# Patient Record
Sex: FEMALE | Race: WHITE | Employment: OTHER | URBAN - METROPOLITAN AREA
[De-identification: names, ages, dates, MRNs, and addresses within clinical notes are randomized per-mention and may not be internally consistent; named-entity substitution may affect disease eponyms.]

---

## 2017-05-11 ENCOUNTER — HOSPITAL ENCOUNTER (OUTPATIENT)
Dept: WOMENS IMAGING | Age: 67
Discharge: HOME | End: 2017-05-11
Attending: FAMILY MEDICINE | Admitting: FAMILY MEDICINE

## 2017-05-11 DIAGNOSIS — Z12.31 VISIT FOR SCREENING MAMMOGRAM: ICD-10-CM

## 2018-03-29 PROBLEM — R55 SYNCOPE AND COLLAPSE: Status: ACTIVE | Noted: 2018-03-29

## 2018-03-29 PROBLEM — E03.9 HYPOTHYROIDISM: Status: ACTIVE | Noted: 2018-03-29

## 2018-03-29 PROBLEM — H40.9 GLAUCOMA: Status: ACTIVE | Noted: 2018-03-29

## 2018-03-29 PROBLEM — E87.1 HYPONATREMIA: Status: ACTIVE | Noted: 2018-03-29

## 2018-03-29 PROBLEM — E86.0 DEHYDRATION: Status: ACTIVE | Noted: 2018-03-29

## 2018-04-28 PROBLEM — E86.0 DEHYDRATION: Status: RESOLVED | Noted: 2018-03-29 | Resolved: 2018-04-28

## 2018-05-24 ENCOUNTER — HOSPITAL ENCOUNTER (OUTPATIENT)
Dept: MAMMOGRAPHY | Age: 68
Discharge: HOME | End: 2018-05-24
Attending: FAMILY MEDICINE | Admitting: FAMILY MEDICINE

## 2018-05-24 DIAGNOSIS — Z12.31 VISIT FOR SCREENING MAMMOGRAM: ICD-10-CM

## 2019-05-28 ENCOUNTER — HOSPITAL ENCOUNTER (OUTPATIENT)
Dept: WOMENS IMAGING | Age: 69
Discharge: HOME | End: 2019-05-28
Payer: MEDICARE

## 2019-05-28 DIAGNOSIS — Z80.3 FAMILY HISTORY OF BREAST CANCER IN MOTHER: ICD-10-CM

## 2019-05-28 DIAGNOSIS — Z12.39 BREAST CANCER SCREENING: ICD-10-CM

## 2019-05-28 PROCEDURE — 77063 BREAST TOMOSYNTHESIS BI: CPT

## 2019-06-17 ENCOUNTER — HOSPITAL ENCOUNTER (OUTPATIENT)
Dept: GENERAL RADIOLOGY | Age: 69
Discharge: HOME | End: 2019-06-17
Payer: MEDICARE

## 2019-06-17 DIAGNOSIS — M81.0 OSTEOPOROSIS, UNSPECIFIED OSTEOPOROSIS TYPE, UNSPECIFIED PATHOLOGICAL FRACTURE PRESENCE: ICD-10-CM

## 2019-06-17 DIAGNOSIS — Z00.00 WELL WOMAN EXAM (NO GYNECOLOGICAL EXAM): ICD-10-CM

## 2019-06-17 PROCEDURE — 77080 DXA BONE DENSITY AXIAL: CPT

## 2020-07-13 ENCOUNTER — HOSPITAL ENCOUNTER (OUTPATIENT)
Dept: WOMENS IMAGING | Age: 70
Discharge: HOME | End: 2020-07-13
Payer: MEDICARE

## 2020-07-13 PROCEDURE — 77063 BREAST TOMOSYNTHESIS BI: CPT

## 2020-08-02 ENCOUNTER — APPOINTMENT (OUTPATIENT)
Dept: GENERAL RADIOLOGY | Age: 70
End: 2020-08-02
Payer: MEDICARE

## 2020-08-02 ENCOUNTER — HOSPITAL ENCOUNTER (EMERGENCY)
Age: 70
Discharge: HOME | End: 2020-08-02
Attending: EMERGENCY MEDICINE
Payer: MEDICARE

## 2020-08-02 ENCOUNTER — APPOINTMENT (OUTPATIENT)
Dept: CT IMAGING | Age: 70
End: 2020-08-02
Payer: MEDICARE

## 2020-08-02 VITALS
DIASTOLIC BLOOD PRESSURE: 87 MMHG | OXYGEN SATURATION: 99 % | TEMPERATURE: 98.8 F | RESPIRATION RATE: 18 BRPM | HEART RATE: 84 BPM | SYSTOLIC BLOOD PRESSURE: 112 MMHG

## 2020-08-02 PROCEDURE — 6360000002 HC RX W HCPCS: Performed by: NURSE PRACTITIONER

## 2020-08-02 PROCEDURE — 6370000000 HC RX 637 (ALT 250 FOR IP): Performed by: NURSE PRACTITIONER

## 2020-08-02 PROCEDURE — 99284 EMERGENCY DEPT VISIT MOD MDM: CPT

## 2020-08-02 PROCEDURE — 96372 THER/PROPH/DIAG INJ SC/IM: CPT

## 2020-08-02 PROCEDURE — 73502 X-RAY EXAM HIP UNI 2-3 VIEWS: CPT

## 2020-08-02 PROCEDURE — 72131 CT LUMBAR SPINE W/O DYE: CPT

## 2020-08-02 RX ORDER — ORPHENADRINE CITRATE 30 MG/ML
60 INJECTION INTRAMUSCULAR; INTRAVENOUS ONCE
Status: COMPLETED | OUTPATIENT
Start: 2020-08-02 | End: 2020-08-02

## 2020-08-02 RX ORDER — KETOROLAC TROMETHAMINE 10 MG/1
10 TABLET, FILM COATED ORAL EVERY 8 HOURS PRN
Qty: 20 TABLET | Refills: 0 | Status: SHIPPED | OUTPATIENT
Start: 2020-08-02 | End: 2020-10-05 | Stop reason: ALTCHOICE

## 2020-08-02 RX ORDER — KETOROLAC TROMETHAMINE 30 MG/ML
30 INJECTION, SOLUTION INTRAMUSCULAR; INTRAVENOUS ONCE
Status: COMPLETED | OUTPATIENT
Start: 2020-08-02 | End: 2020-08-02

## 2020-08-02 RX ORDER — METHOCARBAMOL 750 MG/1
750-1500 TABLET, FILM COATED ORAL EVERY 8 HOURS PRN
Qty: 40 TABLET | Refills: 0 | Status: SHIPPED | OUTPATIENT
Start: 2020-08-02 | End: 2020-08-12

## 2020-08-02 RX ORDER — PREDNISONE 20 MG/1
60 TABLET ORAL ONCE
Status: COMPLETED | OUTPATIENT
Start: 2020-08-02 | End: 2020-08-02

## 2020-08-02 RX ORDER — HYDROCODONE BITARTRATE AND ACETAMINOPHEN 5; 325 MG/1; MG/1
1 TABLET ORAL EVERY 6 HOURS PRN
Qty: 12 TABLET | Refills: 0 | Status: SHIPPED | OUTPATIENT
Start: 2020-08-02 | End: 2020-08-05

## 2020-08-02 RX ORDER — HYDROCODONE BITARTRATE AND ACETAMINOPHEN 5; 325 MG/1; MG/1
1 TABLET ORAL ONCE
Status: COMPLETED | OUTPATIENT
Start: 2020-08-02 | End: 2020-08-02

## 2020-08-02 RX ADMIN — ORPHENADRINE CITRATE 60 MG: 30 INJECTION INTRAMUSCULAR; INTRAVENOUS at 08:42

## 2020-08-02 RX ADMIN — KETOROLAC TROMETHAMINE 30 MG: 30 INJECTION, SOLUTION INTRAMUSCULAR at 08:41

## 2020-08-02 RX ADMIN — HYDROCODONE BITARTRATE AND ACETAMINOPHEN 1 TABLET: 5; 325 TABLET ORAL at 08:41

## 2020-08-02 RX ADMIN — PREDNISONE 60 MG: 20 TABLET ORAL at 08:41

## 2020-08-02 ASSESSMENT — PAIN SCALES - GENERAL
PAINLEVEL_OUTOF10: 10
PAINLEVEL_OUTOF10: 10

## 2020-08-02 ASSESSMENT — ENCOUNTER SYMPTOMS
DIARRHEA: 0
CHEST TIGHTNESS: 0
BACK PAIN: 1
ABDOMINAL PAIN: 0
SHORTNESS OF BREATH: 0
VOMITING: 0
NAUSEA: 0

## 2020-08-02 NOTE — ED PROVIDER NOTES
I independently performed a history and physical on [facility]. All diagnostic, treatment, and disposition decisions were made by myself in conjunction with the advanced practice provider. Briefly, this is a 68 y.o. female here for right hip pain and right low back pain. The patient's pain is been present for 1 to 2 weeks. The pain is severe. She had an x-ray performed at her PCPs, but it was unremarkable. Patient occasionally has tingling down the right leg. She denies saddle anesthesia, fecal incontinence, urinary retention, focal leg weakness, and other symptoms. .    On exam, the patient appears well-hydrated, well-nourished, and in no acute distress. Mucous membranes are moist. Speech is clear. Breathing is unlabored. Skin is dry. Mental status is normal. The patient moves all extremities. Face is symmetric without droop. Lumbar spine is nontender to palpation. Straight leg raise is negative bilaterally. Patient has normal patellar reflexes bilaterally. She has normal sensation throughout the bilateral lower extremities, including the bilateral medial and lateral thighs, medial and lateral lower legs, and feet. She has normal plantar and dorsiflexion of the feet bilateral.    Patient Referrals:  [facility] Emergency Department  [address]  [phone]    As needed, If symptoms worsen or new symptoms develop    [address] [place], [place]  [address] [facility]  [phone]    In 3 days  for re-evaluation      Discharge Medications:  New Prescriptions    HYDROCODONE-ACETAMINOPHEN (NORCO) 5-325 MG PER TABLET    Take 1 tablet by mouth every 6 hours as needed for Pain for up to 3 days. Do NOT take if you are driving. This medication may make you groggy/sleepy.     KETOROLAC (TORADOL) 10 MG TABLET    Take 1 tablet by mouth every 8 hours as needed for Pain    METHOCARBAMOL (ROBAXIN-750) 750 MG TABLET    Take 1-2 tablets by mouth every 8 hours as needed (back cramps or pain) Do NOT take if you are driving. This medication may make you groggy/sleepy. FINAL IMPRESSION  1. Acute right-sided low back pain without sciatica    2. Acute right hip pain        Blood pressure (!) 155/78, pulse 70, temperature 98.8 °F (37.1 °C), temperature source Oral, resp. rate 18, SpO2 100 %. For further details of [facility] emergency department encounter, please see documentation by advanced practice provider, Jason Johnston CNP.        Barbara Cummins MD  08/02/20 7226

## 2020-08-02 NOTE — ED NOTES
Bed: 20  Expected date:   Expected time:   Means of arrival: [facility]  Comments:   [facility], [facility]  08/02/20 4319

## 2020-08-02 NOTE — ED PROVIDER NOTES
[address] [facility]        Pt Name: Stuart Arrington  MRN: [id number]  [place] 1950  Date of evaluation: 8/2/2020  Provider: ALEXI Rubio CNP  PCP: LYNDON Paz MD     I have seen and evaluated this patient with my supervising physician quin sampson    [address] [place] [address]       Chief Complaint   Patient presents with    Hip Pain     Patient in with complaints of right hip pain for one week. Unable to remember what she was doing when she felt the pain denies injury. Patient has been seeing pcp for complaint       HISTORY OF PRESENT ILLNESS   (Location, Timing/Onset, Context/Setting, Quality, Duration, Modifying Factors, Severity, Associated Signs and Symptoms)  Note limiting factors. Stuart Arrington is a 68 y.o. female presents to the emergency department today complaining of severe low back pain that radiates into the right hip. The patient reports that she had an onset of pain about 2 weeks ago, had a virtual visit with her primary care provider and had an outpatient x-ray ordered. States that she was told to follow-up with chiropractory but this pain did resolve without intervention. States that she did not follow-up. She report several days ago the pain settled into the right low back radiating into the right lateral hip. She reports that pain is severe with any ambulation or movement, slightly better when she lays directly on the side. She denies any injury or trauma. There is no focal weakness. No paresthesia. Denies any headache, fever, lightheadedness, dizziness, visual disturbances. No chest pain or pressure. No neck pain. No shortness of breath, cough, or congestion. No abdominal pain, nausea, vomiting, diarrhea, constipation, or dysuria. No rash. Nursing Notes were all reviewed and agreed with or any disagreements were addressed in the HPI.     REVIEW OF SYSTEMS    (2-9 systems for level 4, 10 or more for level 5)     Review of Systems   Constitutional: Negative for activity change, chills and fever. Respiratory: Negative for chest tightness and shortness of breath. Cardiovascular: Negative for chest pain. Gastrointestinal: Negative for abdominal pain, diarrhea, nausea and vomiting. Genitourinary: Negative for dysuria. Musculoskeletal: Positive for back pain. All other systems reviewed and are negative. Positives and Pertinent negatives as per HPI. Except as noted above in the ROS, all other systems were reviewed and negative. PAST MEDICAL HISTORY     Past Medical History:   Diagnosis Date    Thyroid disease          SURGICAL HISTORY     Past Surgical History:   Procedure Laterality Date    BLEPHAROPLASTY Left     DENTAL SURGERY      implant         CURRENTMEDICATIONS       Discharge Medication List as of 8/2/2020 10:24 AM      CONTINUE these medications which have NOT CHANGED    Details   timolol (TIMOPTIC) 0.5 % ophthalmic solution 1 drop 2 (two) times daily. Historical Med      Multiple Vitamin (MULTIVITAMIN) tablet Take 1 tablet by mouthHistorical Med      Calcium 500-125 MG-UNIT TABS Take 1 tablet by mouthHistorical Med      levothyroxine (SYNTHROID) 50 MCG tablet Take 50 mcg by mouth DailyHistorical Med      alendronate (FOSAMAX) 70 MG tablet Take 70 mg by mouth every 7 daysHistorical Med      latanoprost (XALATAN) 0.005 % ophthalmic solution 1 drop nightlyHistorical Med               ALLERGIES     Patient has no known allergies. FAMILYHISTORY     History reviewed. No pertinent family history.        SOCIAL HISTORY       Social History     Tobacco Use    Smoking status: Former Smoker    Smokeless tobacco: Never Used   Substance Use Topics    Alcohol use: Not on file     Comment: occ    Drug use: No       SCREENINGS             PHYSICAL EXAM    (up to 7 for level 4, 8 or more for level 5)     ED Triage Vitals [08/02/20 0723]   BP Temp Temp Source Pulse Resp SpO2 Height subluxation. L3-4 degenerative anterolisthesis. L3-4 and L4-5 moderate narrowing of the central canal.         XR HIP RIGHT (2-3 VIEWS)   Final Result   Osteopenia. Mild degenerative changes. No acute osseous abnormality or   significant change. Xr Hip Right (2-3 Views)    Result Date: 8/2/2020  EXAMINATION: TWO XRAY VIEWS OF THE RIGHT HIP 8/2/2020 7:41 am COMPARISON: 29 March 2018 HISTORY: ORDERING SYSTEM PROVIDED HISTORY: pain, no injury TECHNOLOGIST PROVIDED HISTORY: Reason for exam:->pain, no injury FINDINGS: Osteopenia is present. Mild degenerative changes are present in the SI joints, symphysis pubis and both hips. No acute fracture or dislocation is noted. Mild degenerative and degenerative disc changes are present in the lower lumbar spine. Fecal material overlies the sacrum. Osteopenia. Mild degenerative changes. No acute osseous abnormality or significant change. PROCEDURES   Unless otherwise noted below, none     Procedures    CRITICAL CARE TIME   N/A    CONSULTS:  None      EMERGENCY DEPARTMENT COURSE and DIFFERENTIAL DIAGNOSIS/MDM:   Vitals:    Vitals:    08/02/20 0723 08/02/20 1024   BP: (!) 155/78 112/87   Pulse: 70 84   Resp: 18 18   Temp: 98.8 °F (37.1 °C)    TempSrc: Oral    SpO2: 100% 99%       Patient was given the following medications:  Medications   ketorolac (TORADOL) injection 30 mg (30 mg Intramuscular Given 8/2/20 0841)   orphenadrine (NORFLEX) injection 60 mg (60 mg Intramuscular Given 8/2/20 0842)   HYDROcodone-acetaminophen (NORCO) 5-325 MG per tablet 1 tablet (1 tablet Oral Given 8/2/20 0841)   predniSONE (DELTASONE) tablet 60 mg (60 mg Oral Given 8/2/20 0841)           Briefly, this is a 68year old female that  presents to the emergency department today complaining of severe low back pain that radiates into the right hip.   The patient reports that she had an onset of pain about 2 weeks ago, had a virtual visit with her primary care provider and had an outpatient x-ray ordered. States that she was told to follow-up with chiropractory but this pain did resolve without intervention. States that she did not follow-up. She report several days ago the pain settled into the right low back radiating into the right lateral hip. She reports that pain is severe with any ambulation or movement, slightly better when she lays directly on the side. She denies any injury or trauma. There is no focal weakness. No paresthesia.         CT LUMBAR SPINE WO CONTRAST (Final result)   Result time 08/02/20 09:46:01   Final result by Brannon Pappas MD (08/02/20 09:46:01)                 Impression:     No acute fracture or subluxation. L3-4 degenerative anterolisthesis. L3-4 and L4-5 moderate narrowing of the central canal.               XR HIP RIGHT (2-3 VIEWS) (Final result)   Result time 08/02/20 07:59:10   Final result by Randall Argueta MD (08/02/20 07:59:10)                 Impression:     Osteopenia.  Mild degenerative changes.  No acute osseous abnormality or   significant change. Patient was given Toradol, Norflex, Norco, and prednisone. To be discharged home with Robaxin, Toradol and Norco by attending physician. Please see his note for disposition. Patient is feeling better and ambulatory. Pt denies any history of new numbness, weakness, incontinence of bowel or bladder, constipation, saddle anesthesia or paresthesias. I estimate there is LOW risk for ABDOMINAL AORTIC ANEURYSM, CAUDA EQUINA SYNDROME, EPIDURAL MASS LESION, OR CORD COMPRESSION, thus I consider the discharge disposition reasonable. FINAL IMPRESSION      1. Acute right-sided low back pain without sciatica    2.  Acute right hip pain          DISPOSITION/PLAN   DISPOSITION        PATIENT REFERREDTO:  [facility] Emergency Department  [address] [place] [address]  [phone]    As needed, If symptoms worsen or new symptoms develop    Manda Balderas MD Jason  [address] [facility] [address]  [phone]    In 3 days  for re-evaluation      DISCHARGE MEDICATIONS:  Discharge Medication List as of 8/2/2020 10:24 AM      START taking these medications    Details   methocarbamol (ROBAXIN-750) 750 MG tablet Take 1-2 tablets by mouth every 8 hours as needed (back cramps or pain) Do NOT take if you are driving. This medication may make you groggy/sleepy. , Disp-40 tablet,R-0Print      HYDROcodone-acetaminophen (NORCO) 5-325 MG per tablet Take 1 tablet by mouth every 6 hours as needed for Pain for up to 3 days. Do NOT take if you are driving. This medication may make you groggy/sleepy. , Disp-12 tablet,R-0Print      ketorolac (TORADOL) 10 MG tablet Take 1 tablet by mouth every 8 hours as needed for Pain, Disp-20 tablet,R-0Print             DISCONTINUED MEDICATIONS:  Discharge Medication List as of 8/2/2020 10:24 AM                 (Please note that portions of this note were completed with a voice recognition program.  Efforts were made to edit the dictations but occasionally words are mis-transcribed.)    ALEXI Kerns CNP (electronically signed)           ALEXI Kerns CNP  08/02/20 [id number]

## 2020-08-02 NOTE — ED NOTES
Patient in with complaints of right hip pain for a week. States that she did not fall but is also unaware of what happened to cause the pain. Patient has been seeing PCP and had xray's done. Patient missed follow appointment and pain began again this morning rated at 10/10. Denies further needs.  Xray to be completed      Anabelle Melton RN  08/02/20 1903

## 2020-10-05 ENCOUNTER — APPOINTMENT (OUTPATIENT)
Dept: GENERAL RADIOLOGY | Age: 70
End: 2020-10-05
Payer: MEDICARE

## 2020-10-05 ENCOUNTER — APPOINTMENT (OUTPATIENT)
Dept: CT IMAGING | Age: 70
End: 2020-10-05
Payer: MEDICARE

## 2020-10-05 ENCOUNTER — HOSPITAL ENCOUNTER (OUTPATIENT)
Age: 70
Setting detail: OBSERVATION
Discharge: HOME | End: 2020-10-06
Attending: EMERGENCY MEDICINE | Admitting: INTERNAL MEDICINE
Payer: MEDICARE

## 2020-10-05 PROBLEM — G45.9 TIA (TRANSIENT ISCHEMIC ATTACK): Status: ACTIVE | Noted: 2020-10-05

## 2020-10-05 LAB
ANION GAP SERPL CALCULATED.3IONS-SCNC: 13 MMOL/L (ref 3–16)
BASOPHILS ABSOLUTE: 0 K/UL (ref 0–0.2)
BASOPHILS RELATIVE PERCENT: 0.7 %
BUN BLDV-MCNC: 18 MG/DL (ref 7–20)
CALCIUM SERPL-MCNC: 9.7 MG/DL (ref 8.3–10.6)
CHLORIDE BLD-SCNC: 97 MMOL/L (ref 99–110)
CO2: 24 MMOL/L (ref 21–32)
CREAT SERPL-MCNC: 0.8 MG/DL (ref 0.6–1.2)
EKG ATRIAL RATE: 80 BPM
EKG DIAGNOSIS: NORMAL
EKG P AXIS: 35 DEGREES
EKG P-R INTERVAL: 138 MS
EKG Q-T INTERVAL: 394 MS
EKG QRS DURATION: 90 MS
EKG QTC CALCULATION (BAZETT): 454 MS
EKG R AXIS: 0 DEGREES
EKG T AXIS: 42 DEGREES
EKG VENTRICULAR RATE: 80 BPM
EOSINOPHILS ABSOLUTE: 0.1 K/UL (ref 0–0.6)
EOSINOPHILS RELATIVE PERCENT: 1.4 %
GFR AFRICAN AMERICAN: >60
GFR NON-AFRICAN AMERICAN: >60
GLUCOSE BLD-MCNC: 137 MG/DL (ref 70–99)
HCT VFR BLD CALC: 43.2 % (ref 36–48)
HEMOGLOBIN: 14.9 G/DL (ref 12–16)
INR BLD: 1.02 (ref 0.86–1.14)
LYMPHOCYTES ABSOLUTE: 1 K/UL (ref 1–5.1)
LYMPHOCYTES RELATIVE PERCENT: 16.9 %
MCH RBC QN AUTO: 36.4 PG (ref 26–34)
MCHC RBC AUTO-ENTMCNC: 34.4 G/DL (ref 31–36)
MCV RBC AUTO: 105.6 FL (ref 80–100)
MONOCYTES ABSOLUTE: 0.3 K/UL (ref 0–1.3)
MONOCYTES RELATIVE PERCENT: 5.1 %
NEUTROPHILS ABSOLUTE: 4.7 K/UL (ref 1.7–7.7)
NEUTROPHILS RELATIVE PERCENT: 75.9 %
PDW BLD-RTO: 12.9 % (ref 12.4–15.4)
PLATELET # BLD: 263 K/UL (ref 135–450)
PMV BLD AUTO: 7.9 FL (ref 5–10.5)
POTASSIUM SERPL-SCNC: 4.3 MMOL/L (ref 3.5–5.1)
PROTHROMBIN TIME: 11.8 SEC (ref 10–13.2)
RBC # BLD: 4.09 M/UL (ref 4–5.2)
SODIUM BLD-SCNC: 134 MMOL/L (ref 136–145)
TROPONIN: <0.01 NG/ML
WBC # BLD: 6.1 K/UL (ref 4–11)

## 2020-10-05 PROCEDURE — 93010 ELECTROCARDIOGRAM REPORT: CPT | Performed by: INTERNAL MEDICINE

## 2020-10-05 PROCEDURE — 6370000000 HC RX 637 (ALT 250 FOR IP): Performed by: EMERGENCY MEDICINE

## 2020-10-05 PROCEDURE — 85025 COMPLETE CBC W/AUTO DIFF WBC: CPT

## 2020-10-05 PROCEDURE — 94760 N-INVAS EAR/PLS OXIMETRY 1: CPT

## 2020-10-05 PROCEDURE — 70450 CT HEAD/BRAIN W/O DYE: CPT

## 2020-10-05 PROCEDURE — 80048 BASIC METABOLIC PNL TOTAL CA: CPT

## 2020-10-05 PROCEDURE — 6360000004 HC RX CONTRAST MEDICATION: Performed by: EMERGENCY MEDICINE

## 2020-10-05 PROCEDURE — 93005 ELECTROCARDIOGRAM TRACING: CPT | Performed by: EMERGENCY MEDICINE

## 2020-10-05 PROCEDURE — 6370000000 HC RX 637 (ALT 250 FOR IP): Performed by: INTERNAL MEDICINE

## 2020-10-05 PROCEDURE — G0378 HOSPITAL OBSERVATION PER HR: HCPCS

## 2020-10-05 PROCEDURE — 2060000000 HC ICU INTERMEDIATE R&B

## 2020-10-05 PROCEDURE — 99285 EMERGENCY DEPT VISIT HI MDM: CPT

## 2020-10-05 PROCEDURE — 2580000003 HC RX 258: Performed by: INTERNAL MEDICINE

## 2020-10-05 PROCEDURE — 71045 X-RAY EXAM CHEST 1 VIEW: CPT

## 2020-10-05 PROCEDURE — 84484 ASSAY OF TROPONIN QUANT: CPT

## 2020-10-05 PROCEDURE — 85610 PROTHROMBIN TIME: CPT

## 2020-10-05 PROCEDURE — 70496 CT ANGIOGRAPHY HEAD: CPT

## 2020-10-05 RX ORDER — LANOLIN ALCOHOL/MO/W.PET/CERES
6 CREAM (GRAM) TOPICAL NIGHTLY PRN
Status: DISCONTINUED | OUTPATIENT
Start: 2020-10-05 | End: 2020-10-06 | Stop reason: HOSPADM

## 2020-10-05 RX ORDER — METHOCARBAMOL 750 MG/1
750 TABLET, FILM COATED ORAL 2 TIMES DAILY PRN
COMMUNITY
Start: 2020-09-04 | End: 2020-10-05 | Stop reason: ALTCHOICE

## 2020-10-05 RX ORDER — ONDANSETRON 2 MG/ML
4 INJECTION INTRAMUSCULAR; INTRAVENOUS EVERY 6 HOURS PRN
Status: DISCONTINUED | OUTPATIENT
Start: 2020-10-05 | End: 2020-10-06 | Stop reason: HOSPADM

## 2020-10-05 RX ORDER — SODIUM CHLORIDE 0.9 % (FLUSH) 0.9 %
10 SYRINGE (ML) INJECTION PRN
Status: DISCONTINUED | OUTPATIENT
Start: 2020-10-05 | End: 2020-10-06 | Stop reason: HOSPADM

## 2020-10-05 RX ORDER — LORAZEPAM 1 MG/1
1 TABLET ORAL EVERY 8 HOURS PRN
COMMUNITY
End: 2021-05-03 | Stop reason: ALTCHOICE

## 2020-10-05 RX ORDER — ACETAMINOPHEN 650 MG/1
650 SUPPOSITORY RECTAL EVERY 4 HOURS PRN
Status: DISCONTINUED | OUTPATIENT
Start: 2020-10-05 | End: 2020-10-06 | Stop reason: HOSPADM

## 2020-10-05 RX ORDER — POLYETHYLENE GLYCOL 3350 17 G/17G
17 POWDER, FOR SOLUTION ORAL DAILY PRN
Status: DISCONTINUED | OUTPATIENT
Start: 2020-10-05 | End: 2020-10-06 | Stop reason: HOSPADM

## 2020-10-05 RX ORDER — SODIUM CHLORIDE 0.9 % (FLUSH) 0.9 %
10 SYRINGE (ML) INJECTION EVERY 12 HOURS SCHEDULED
Status: DISCONTINUED | OUTPATIENT
Start: 2020-10-05 | End: 2020-10-06 | Stop reason: HOSPADM

## 2020-10-05 RX ORDER — ASPIRIN 300 MG/1
300 SUPPOSITORY RECTAL DAILY
Status: DISCONTINUED | OUTPATIENT
Start: 2020-10-06 | End: 2020-10-06 | Stop reason: HOSPADM

## 2020-10-05 RX ORDER — HYDROCODONE BITARTRATE AND ACETAMINOPHEN 5; 325 MG/1; MG/1
TABLET ORAL
COMMUNITY
Start: 2020-08-07 | End: 2020-10-05 | Stop reason: ALTCHOICE

## 2020-10-05 RX ORDER — PROMETHAZINE HYDROCHLORIDE 25 MG/1
12.5 TABLET ORAL EVERY 6 HOURS PRN
Status: DISCONTINUED | OUTPATIENT
Start: 2020-10-05 | End: 2020-10-06 | Stop reason: HOSPADM

## 2020-10-05 RX ORDER — ASPIRIN 81 MG/1
162 TABLET, CHEWABLE ORAL ONCE
Status: COMPLETED | OUTPATIENT
Start: 2020-10-05 | End: 2020-10-05

## 2020-10-05 RX ORDER — LISINOPRIL 10 MG/1
20 TABLET ORAL DAILY
COMMUNITY

## 2020-10-05 RX ORDER — LABETALOL HYDROCHLORIDE 5 MG/ML
10 INJECTION, SOLUTION INTRAVENOUS EVERY 10 MIN PRN
Status: DISCONTINUED | OUTPATIENT
Start: 2020-10-05 | End: 2020-10-06 | Stop reason: HOSPADM

## 2020-10-05 RX ORDER — ACETAMINOPHEN 325 MG/1
650 TABLET ORAL EVERY 4 HOURS PRN
Status: DISCONTINUED | OUTPATIENT
Start: 2020-10-05 | End: 2020-10-06 | Stop reason: HOSPADM

## 2020-10-05 RX ORDER — ASPIRIN 81 MG/1
81 TABLET ORAL DAILY
Status: DISCONTINUED | OUTPATIENT
Start: 2020-10-06 | End: 2020-10-06 | Stop reason: HOSPADM

## 2020-10-05 RX ADMIN — Medication 10 ML: at 22:49

## 2020-10-05 RX ADMIN — ASPIRIN 162 MG: 81 TABLET, CHEWABLE ORAL at 16:48

## 2020-10-05 RX ADMIN — IOPAMIDOL 75 ML: 755 INJECTION, SOLUTION INTRAVENOUS at 15:08

## 2020-10-05 RX ADMIN — Medication 6 MG: at 22:43

## 2020-10-05 ASSESSMENT — PAIN SCALES - GENERAL: PAINLEVEL_OUTOF10: 0

## 2020-10-05 NOTE — ED PROVIDER NOTES
eMERGENCY dEPARTMENT eNCOUnter      PtName: Linette Fried  MRN: [id number]  [place] 1950  Date of evaluation: 10/5/2020  Provider: Millie Kincaid DO     CHIEF COMPLAINT       Chief Complaint   Patient presents with    Hypertension     pt states having high blood pressure- for several days- states Saturday- she \"couldn't find words\" for a few seconds, felt \"dull pain behind left ear for one hour\" blurry vision for short period of time as well on Saturday- none of these symptoms today- denies numbness. HISTORY OF PRESENT ILLNESS   (Location/Symptom, Timing/Onset,Context/Setting, Quality, Duration, Modifying Factors, Severity) Note limiting factors. HPI    Linette Fried is a 65 y.o. female who presents to the emergency department with chief complaint of expressive aphasia-word searching, left-sided headache and vision changes. Last occurred on Friday. Since then she is had elevated blood pressure despite taking her blood pressure medications. No chest pain shortness of breath or current headache. Her headache the other day when the symptoms were occurring worse left-sided parietal achy 4/10. No radiation. No alleviating or aggravating factors. No fever or neck stiffness. Nursing Notes were reviewed. REVIEW OF SYSTEMS    (2+ forlevel 4; 10+ for level 5)     Review of Systems  See hpi for further details. Complete 10 point review of all systems performed and is otherwise negative unless noted above.     PAST MEDICAL HISTORY     Past Medical History:   Diagnosis Date    Bulging lumbar disc     August 2020    Degenerative disc disease, lumbar 08/2020    Thyroid disease        SURGICAL HISTORY       Past Surgical History:   Procedure Laterality Date    BLEPHAROPLASTY Left     DENTAL SURGERY      implant       CURRENT MEDICATIONS       Previous Medications    ALENDRONATE (FOSAMAX) 70 MG TABLET    Take 70 mg by mouth every 7 days    CALCIUM 500-125 MG-UNIT TABS    Take 1 tablet by mouth    LATANOPROST (XALATAN) 0.005 % OPHTHALMIC SOLUTION    1 drop nightly    LEVOTHYROXINE (SYNTHROID) 75 MCG TABLET    Take 75 mcg by mouth nightly     LISINOPRIL (PRINIVIL;ZESTRIL) 5 MG TABLET    Take 5 mg by mouth daily    LORAZEPAM (ATIVAN) 1 MG TABLET    Take 1 mg by mouth every 8 hours as needed for Anxiety. MULTIPLE VITAMIN (MULTIVITAMIN) TABLET    Take 1 tablet by mouth       ALLERGIES     Patient has no known allergies. FAMILY HISTORY       Family History   Problem Relation Age of Onset    Breast Cancer Mother     Cancer Mother     Colon Cancer Father     Cancer Sister     Other Sister     Cancer Brother           SOCIAL HISTORY       Social History     Socioeconomic History    Marital status:      Spouse name: Martha Pendleton Number of children: 11    Years of education: None    Highest education level: None   Occupational History    None   Social Needs    Financial resource strain: None    Food insecurity     Worry: None     Inability: None    Transportation needs     Medical: None     Non-medical: None   Tobacco Use    Smoking status: Former Smoker     Packs/day: 1.00     Years: 15.00     Pack years: 15.00     Types: Cigarettes     Start date: 1967     Last attempt to quit: 10/5/1982     Years since quittin.0    Smokeless tobacco: Never Used   Substance and Sexual Activity    Alcohol use:  Yes     Alcohol/week: 6.0 standard drinks     Types: 6 Glasses of wine per week     Comment: occ    Drug use: No    Sexual activity: None   Lifestyle    Physical activity     Days per week: None     Minutes per session: None    Stress: None   Relationships    Social connections     Talks on phone: None     Gets together: None     Attends Zoroastrianism service: None     Active member of club or organization: None     Attends meetings of clubs or organizations: None     Relationship status: None    Intimate partner violence     Fear of current or ex partner: None     Emotionally abused: None     Physically abused: None     Forced sexual activity: None   Other Topics Concern    None   Social History Narrative    None       SCREENINGS           PHYSICAL EXAM    (5+ for level 4, 8+ for level 5)     ED Triage Vitals [10/05/20 1218]   BP Temp Temp Source Pulse Resp SpO2 Height Weight   (!) 181/116 97.5 °F (36.4 °C) Oral 92 16 99 % 5' 4\" (1.626 m) 120 lb (54.4 kg)       Physical Exam  Vitals signs and nursing note reviewed. Constitutional:       General: She is not in acute distress. Appearance: Normal appearance. She is not toxic-appearing or diaphoretic. HENT:      Head: Normocephalic and atraumatic. Right Ear: External ear normal.      Left Ear: External ear normal.      Nose: Nose normal.      Mouth/Throat:      Mouth: Mucous membranes are moist.      Pharynx: Oropharynx is clear. Eyes:      General: No scleral icterus. Right eye: No discharge. Left eye: No discharge. Extraocular Movements: Extraocular movements intact. Conjunctiva/sclera: Conjunctivae normal.      Pupils: Pupils are equal, round, and reactive to light. Neck:      Musculoskeletal: Normal range of motion and neck supple. Cardiovascular:      Pulses: Normal pulses. Pulmonary:      Effort: Pulmonary effort is normal. No respiratory distress. Breath sounds: Normal breath sounds. No stridor. Abdominal:      General: Abdomen is flat. There is no distension. Musculoskeletal: Normal range of motion. General: No swelling, tenderness, deformity or signs of injury. Right lower leg: No edema. Left lower leg: No edema. Skin:     General: Skin is warm and dry. Capillary Refill: Capillary refill takes less than 2 seconds. Coloration: Skin is not jaundiced or pale. Findings: No bruising, erythema, lesion or rash. Neurological:      General: No focal deficit present. Mental Status: She is alert and oriented to person, place, and time. Cranial Nerves: No cranial nerve deficit. Sensory: No sensory deficit. Motor: No weakness. Comments: Patient's NIH Stroke Scale upon arrival is:    Level of Consciousness:  0 - alert; keenly responsive    LOC Questions:  0 - answers both questions correctly    LOC Commands:  0 - performs both tasks correctly    Best Gaze:  0 - normal    Visual Fields:  0 - no visual loss    Facial Palsy:  0 - normal symmetric movement    Motor-Arm-Left:  0 - no drift, limb holds 90 (or 45) degrees for full 10 seconds     Motor-Leg-Left:  0 - no drift; leg holds 30 degree position for full 5 seconds    Motor-Arm-Right:  0 - no drift, limb holds 90 (or 45) degrees for full 10 seconds    Motor-Leg-Right:  0 - no drift; leg holds 30 degree position for full 5 seconds    Limb Ataxia:  0 - absent    Sensory:  0 - normal; no sensory loss    Best Language:  0 - no aphasia, normal    Dysarthria:  0 - normal    Extinction and Inattention:  0 - no abnormality    NIHSS = 0       Psychiatric:         Mood and Affect: Mood normal.         Behavior: Behavior normal.         Thought Content: Thought content normal.         Judgment: Judgment normal.         DIAGNOSTIC RESULTS     EKG (Per Emergency Physician):   EKG interpretation by ED physician: Normal axis, normal sinus rhythm at a rate of 80 bpm. No ischemic ST changes. RADIOLOGY (Per Emergency Physician): Interpretation per the Radiologist below, if available at the time of this note:  Ct Head Wo Contrast    Result Date: 10/5/2020  EXAMINATION: CT OF THE HEAD WITHOUT CONTRAST  10/5/2020 2:33 pm TECHNIQUE: CT of the head was performed without the administration of intravenous contrast. Dose modulation, iterative reconstruction, and/or weight based adjustment of the mA/kV was utilized to reduce the radiation dose to as low as reasonably achievable. COMPARISON: CTA head same date.  HISTORY: ORDERING SYSTEM PROVIDED HISTORY: Neuro symptoms Saturday no current symptoms with elevated blood pressure TECHNOLOGIST PROVIDED HISTORY: Reason for exam:->Neuro symptoms Saturday no current symptoms with elevated blood pressure Has a \"code stroke\" or \"stroke alert\" been called?-> no Reason for Exam: elevated bp, neuro symptoms Acuity: Acute Type of Exam: Initial FINDINGS: BRAIN/VENTRICLES: There is no acute intracranial hemorrhage, mass effect or midline shift. No abnormal extra-axial fluid collection. The gray-white differentiation is maintained without evidence of an acute infarct. There is no evidence of hydrocephalus. ORBITS: The visualized portion of the orbits demonstrate no acute abnormality. SINUSES: The visualized paranasal sinuses and mastoid air cells demonstrate no acute abnormality. SOFT TISSUES/SKULL:  No acute abnormality of the visualized skull or soft tissues. No acute intracranial abnormality. Xr Chest Portable    Result Date: 10/5/2020  EXAMINATION: ONE XRAY VIEW OF THE CHEST 10/5/2020 2:34 pm COMPARISON: March 29, 2018 HISTORY: ORDERING SYSTEM PROVIDED HISTORY: CP TECHNOLOGIST PROVIDED HISTORY: Reason for exam:->CP Reason for Exam: Hypertension (pt states having high blood pressure- for several days- states Saturday- she \"couldn't find words\" for a few seconds, felt \"dull pain behind left ear for one hour\" blurry vision for short period of time as well on Saturday- none of these symptoms today- denies numbness. ) Acuity: Unknown Type of Exam: Unknown FINDINGS: Normal cardiac size. No evidence of pneumonia, edema or other acute pulmonary process. No evidence of acute process of the cardiac or mediastinal structures. No evidence of pneumothorax or pleural effusion. No evidence of acute cardiopulmonary disease.      Cta Head Neck W Contrast    Result Date: 10/5/2020  EXAMINATION: CTA OF THE HEAD AND NECK WITH CONTRAST 10/5/2020 2:08 pm: TECHNIQUE: CTA of the head and neck was performed with the administration of intravenous contrast. Multiplanar reformatted images are provided for review. MIP images are provided for review. Stenosis of the internal carotid arteries measured using NASCET criteria. Dose modulation, iterative reconstruction, and/or weight based adjustment of the mA/kV was utilized to reduce the radiation dose to as low as reasonably achievable. 3D surface reformatted and/or curved MIP images were performed submitted for review. These images confirm the findings in the original report. COMPARISON: None. HISTORY: ORDERING SYSTEM PROVIDED HISTORY: expressive aphasia saturday. TECHNOLOGIST PROVIDED HISTORY: Reason for exam:->expressive aphasia saturday. Reason for Exam: hypertension Acuity: Acute Type of Exam: Initial FINDINGS: CTA NECK: AORTIC ARCH/ARCH VESSELS: No dissection or arterial injury. No significant stenosis of the brachiocephalic or subclavian arteries. CAROTID ARTERIES: No dissection, arterial injury, or hemodynamically significant stenosis by NASCET criteria. VERTEBRAL ARTERIES: No dissection, arterial injury, or significant stenosis. SOFT TISSUES: The lung apices are clear. No cervical or superior mediastinal lymphadenopathy. The larynx and pharynx are unremarkable. No acute abnormality of the salivary and thyroid glands. BONES: No acute osseous abnormality. CTA HEAD: ANTERIOR CIRCULATION: No significant stenosis of the intracranial internal carotid, anterior cerebral, or middle cerebral arteries. No aneurysm. POSTERIOR CIRCULATION: No significant stenosis of the vertebral, basilar, or posterior cerebral arteries. No aneurysm. Persistent fetal origin left posterior cerebral artery OTHER: No dural venous sinus thrombosis on this non-dedicated study. BRAIN: No mass effect or midline shift. No extra-axial fluid collection. The gray-white differentiation is maintained. Unremarkable CTA of the head and neck.        LABS:  Labs Reviewed   BASIC METABOLIC PANEL - Abnormal; Notable for the following components:       Result Value    Sodium 134 (*) Chloride 97 (*)     Glucose 137 (*)     All other components within normal limits    Narrative:     Performed at:  [facility]  [address] [place]  [place], [address]   Phone [phone]   CBC WITH AUTO DIFFERENTIAL - Abnormal; Notable for the following components:    .6 (*)     MCH 36.4 (*)     All other components within normal limits    Narrative:     Performed at:  [facility]  [address] [place]  [place], [address]   Phone [phone]   TROPONIN    Narrative:     Performed at:  [facility] [address] [facility]  [place], [address]   Phone [phone]   PROTIME-INR    Narrative:     Performed at:  [facility] [address] [place]  [place], [address]   Phone [phone]       All other labs were within normal range or not returned as of this dictation. EMERGENCY DEPARTMENT COURSE and DIFFERENTIAL DIAGNOSIS/MDM:   Vitals:    Vitals:    10/05/20 1440 10/05/20 1520 10/05/20 1530 10/05/20 1605   BP: (!) 170/107 (!) 167/93 (!) 151/81 (!) 159/90   Pulse: 89 91 85 90   Resp: 22 21 21 20   Temp:       TempSrc:       SpO2: 98% 98% 98% 97%   Weight:       Height:           Medications   iopamidol (ISOVUE-370) 76 % injection 75 mL (75 mLs Intravenous Given 10/5/20 1508)   aspirin chewable tablet 162 mg (162 mg Oral Given 10/5/20 1648)       MDM. Cardiac work-up initiated include CT head and CTA head and neck. Given patient's symptoms which are concerning for TIA recommend admission to the hospital.  181 mg of aspirin taken prior to arrival.    Note that the imaging results are negative but given patient's symptoms concerned of TIA recommend admission and work-up. Additional 162 of aspirin was ordered. X-ray results were explained to the patient and her daughter with her permission.     CONSULTS:  IP CONSULT TO NEUROLOGY    PROCEDURES:  Unless otherwise noted below, none Procedures    FINAL IMPRESSION      1. TIA (transient ischemic attack)    2. Uncontrolled hypertension          DISPOSITION/PLAN   DISPOSITION Admitted 10/05/2020 05:11:56 PM      PATIENT REFERRED TO:  No follow-up provider specified. DISCHARGE MEDICATIONS:  New Prescriptions    No medications on file          (Please note:  Portions of this note were completed with a voice recognition program. Efforts were made to edit the dictations but occasionally words and phrases are mis-transcribed.)    Form v2016. J.5-cn    Aurelia Fajardo DO (electronically signed)  Emergency Medicine Provider              Rupesh Bear DO  10/05/20 2794

## 2020-10-05 NOTE — H&P
HOSPITALISTS HISTORY AND PHYSICAL    10/5/2020 5:02 PM    Patient Information:  Wilson Rivas is a 65 y.o. female [id number]  PCP:  AMY Authur Fothergill, MD (Tel: [phone] )    Chief complaint:    Chief Complaint   Patient presents with    Hypertension     pt states having high blood pressure- for several days- states Saturday- she \"couldn't find words\" for a few seconds, felt \"dull pain behind left ear for one hour\" blurry vision for short period of time as well on Saturday- none of these symptoms today- denies numbness. History of Present Illness:  Dipak Reyes is a 65 y.o.  female with history of hypothyroidism, sciatica who presented with elevated blood pressures today morning, and difficulty with finding words 2 days ago. Patient is accompanied by her daughter at bedside who is involved in her care. Patient reports that she had difficulty finding words associated with pain behind her left ear, blurry vision on Saturday, which lasted for less than a minute. Patient stayed home. Today morning she reports her blood pressures being as high as 180/110. Patient denies history of hypertension. She was ED in 8/2020 with back pain, diagnosed with sciatica. Patient used Norco and Robaxin for a few days and followed up with Dr. Bebeto Betancourt from pain management. She had pain resolved after she underwent physical therapy and epidurals x2. Currently not taking any pain medications. Patient currently denies symptoms including chest pain, weakness, blurry vision, pain abdomen, nausea or vomiting. In ED, patient's blood pressures are normal range. Hemodynamically stable. Patient got dose of aspirin 181 mg p.o. x1 administered by EMS prior to arrival to ED. CT head and CTA neck showed no acute abnormalities.   Patient is being admitted under hospitalist service for work-up and management of TIA. History obtained from patient and ED provider. REVIEW OF SYSTEMS:   Constitutional: Negative for fever,chills or night sweats  ENT: Negative for rhinorrhea, epistaxis, hoarseness, sore throat. Respiratory: Negative for shortness of breath,wheezing  Cardiovascular: Negative for chest pain, palpitations   Gastrointestinal: Negative for nausea, vomiting, diarrhea  Genitourinary: Negative for polyuria, dysuria   Hematologic/Lymphatic: Negative for bleeding tendency, easy bruising  Musculoskeletal: Negative for myalgias and arthralgias  Neurologic: Negative for confusion,dysarthria. Skin: Negative for itching,rash  Psychiatric: Negative for depression,anxiety, agitation. Endocrine: Negative for polydipsia,polyuria,heat /cold intolerance. Past Medical History:   has a past medical history of Thyroid disease. ,  Sciatica    Past Surgical History:   has a past surgical history that includes Blepharoplasty (Left) and Dental surgery. Medications:  No current facility-administered medications on file prior to encounter. Current Outpatient Medications on File Prior to Encounter   Medication Sig Dispense Refill    HYDROcodone-acetaminophen (NORCO) 5-325 MG per tablet       methocarbamol (ROBAXIN) 750 MG tablet Take 750 mg by mouth 2 times daily as needed      timolol (TIMOPTIC) 0.5 % ophthalmic solution 1 drop 2 (two) times daily.  Multiple Vitamin (MULTIVITAMIN) tablet Take 1 tablet by mouth      Calcium 500-125 MG-UNIT TABS Take 1 tablet by mouth      levothyroxine (SYNTHROID) 50 MCG tablet Take 50 mcg by mouth Daily      alendronate (FOSAMAX) 70 MG tablet Take 70 mg by mouth every 7 days      latanoprost (XALATAN) 0.005 % ophthalmic solution 1 drop nightly         Allergies:  No Known Allergies     Social History:  Patient Lives at home. reports that she has quit smoking. She has never used smokeless tobacco. She reports that she does not use drugs.      Family History:  family history is not on file. Physical Exam:  BP (!) 159/90   Pulse 90   Temp 97.5 °F (36.4 °C) (Oral)   Resp 20   Ht 5' 4\" (1.626 m)   Wt 120 lb (54.4 kg)   SpO2 97%   BMI 20.60 kg/m²     General appearance: Thin built white female, appears comfortable. Eyes: Sclera clear, pupils equal  ENT: Moist mucus membranes, no thrush. Trachea midline. Cardiovascular: Regular rhythm, normal S1, S2. No murmur, gallop, rub. No edema in lower extremities  Respiratory: Clear to auscultation bilaterally, no wheeze, good inspiratory effort  Gastrointestinal: Abdomen soft, non-tender, not distended, normal bowel sounds  Musculoskeletal: No cyanosis in digits, neck supple  Neurology: Cranial nerves grossly intact. Alert and oriented in time, place and person. No speech or motor deficits  Psychiatry: Appropriate affect. Not agitated  Skin: Warm, dry, normal turgor, no rash  Brisk capillary refill, peripheral pulses palpable     Labs:  CBC:   Lab Results   Component Value Date    WBC 6.1 10/05/2020    RBC 4.09 10/05/2020    HGB 14.9 10/05/2020    HCT 43.2 10/05/2020    .6 10/05/2020    MCH 36.4 10/05/2020    MCHC 34.4 10/05/2020    RDW 12.9 10/05/2020     10/05/2020    MPV 7.9 10/05/2020     BMP:    Lab Results   Component Value Date     10/05/2020    K 4.3 10/05/2020    K 3.8 03/30/2018    CL 97 10/05/2020    CO2 24 10/05/2020    BUN 18 10/05/2020    CREATININE 0.8 10/05/2020    CALCIUM 9.7 10/05/2020    GFRAA >60 10/05/2020    LABGLOM >60 10/05/2020    GLUCOSE 137 10/05/2020     CTA HEAD NECK W CONTRAST   Final Result   Unremarkable CTA of the head and neck. CT HEAD WO CONTRAST   Final Result   No acute intracranial abnormality. XR CHEST PORTABLE   Final Result   No evidence of acute cardiopulmonary disease. Chest Xray:   EKG: Normal sinus rhythm, no acute ST-T wave changes  I visualized CXR images and EKG strips  Discussed case  with ED provider.      Problem List  Active Problems:    * No active hospital problems. *  Resolved Problems:    * No resolved hospital problems. *        Assessment/Plan:     Possible TIA:   Plain CT head and CTA head and neck did not show any acute abnormalities. Ordered hemoglobin A1c and lipid panel with a.m. labs. Started on aspirin and low-dose statin. Ordered carotid artery Dopplers, 2D echocardiogram and MRI brain. (Patient reports claustrophobia and anxiety. Requesting for anxiolytic prior to MRI brain). Neurology consulted. Bedside swallow evaluation prior to initiation of diet. PT/OT to evaluate and treat. Newly diagnosed hypertension:  Patient reports high BP readings; as high as 180/110 at home. Current BP is in normal range. Holding off antihypertensives. Hypothyroidism:   TSH, T3-T4 levels ordered with a.m. labs. Resumed on home dose Synthroid. Sciatica, low back pain:  Follows up with Dr. Yohan Horan management as outpatient. Currently asymptomatic. Not taking any pain meds at home. Tylenol as needed for pain. DVT prophylaxis- lovenox sq d  Code status FULLCODE  Diet - CARDIAC GENERAL DIET  IV access - peripheral iv  Diaz Catheter - no     Admit as Observation. I anticipate hospitalization spanning less than two midnights for investigation and treatment of the above medically necessary diagnoses. Please note that some part of this chart was generated using Dragon dictation software. Although every effort was made to ensure the accuracy of this automated transcription, some errors in transcription may have occurred inadvertently. If you may need any clarification, please do not hesitate to contact me through [facility].        Raji Portillo MD    10/5/2020 5:02 PM

## 2020-10-05 NOTE — ACP (ADVANCE CARE PLANNING)
ADVANCED CARE PLANNING    Name:Adriana Whitley       :  1950              MRN:  [id number]      Purpose of Encounter: Advanced care planning in light of hospitalization. Parties in attendance: :Narciso Jurado MD, Family members: Patient's daughter at bedside. Decisional Capacity:Yes    Diagnosis: Active Problems:    TIA (transient ischemic attack)  Resolved Problems:    * No resolved hospital problems. *    Patients Medical Story: Hypothyroidism, sciatica, TIA  Goals of Care Determinations: Patient wishes to focus on going home. Plan: Will notify LYNDON Dunham MD of change in care plan. Will look at further interventions as needed. Code Status: At this time patient wishes to be full code. Agreeable to chest compressions, use of defibrillator, resuscitative medications, intubation, tube feeds, renal replacement therapy if needed. Patient states that she has a living will stating she would like to be DNR CC after she is 78years old. But for now she is a full code. Time Spent with Patient: 10 minutes      Electronically signed by Narciso Shah MD on 10/5/2020 at 5:45 PM  Thank you LYNDON Dunham MD for the opportunity to be involved in this patient's care.

## 2020-10-05 NOTE — PROGRESS NOTES
Patient admitted for new issues with high blood pressure. She lives at home with her  and expect to return home. Patient alert and oriented x 4. Admission completed.

## 2020-10-05 NOTE — CARE COORDINATION
Discharge Planning Assessment    SW discharge planner met with patient to discuss reason for admission, current living situation, and potential needs at the time of discharge. Pt in ED d/t TIA    Demographics/Insurance verified:  Yes    Current type of dwelling:  House - pt ok w/stairs    Living arrangements:  w/spouse    Level of function/Support:  Pt reported she is independent with ADLs. Pt was reported to be steady in ED. Stated spouse is supportive. PCP:  Dr. Jenn Saenz    Last Visit to PCP:  3 weeks ago    DME:  Thierno ansari. Active with any community resources/agencies/skilled home care:  None. Reported no non-skilled needs. Medication compliance issues:  No    Financial issues that could impact healthcare:  No    Tentative discharge plan:  Home most likely. PT/OT pending pt does not feel she will have any therapy needs. Discussed with patient and/or family that on the day of discharge home tentative time of discharge will be between 10 AM and noon. Transportation at the time of discharge:  Pt drives. Spouse can assist home.     Electronically signed by MARBELLA Infante, DAVINAW on 10/5/2020 at 7:37 PM

## 2020-10-06 VITALS
WEIGHT: 114.86 LBS | OXYGEN SATURATION: 97 % | HEART RATE: 83 BPM | RESPIRATION RATE: 24 BRPM | SYSTOLIC BLOOD PRESSURE: 149 MMHG | DIASTOLIC BLOOD PRESSURE: 91 MMHG | BODY MASS INDEX: 19.61 KG/M2 | TEMPERATURE: 97.6 F | HEIGHT: 64 IN

## 2020-10-06 LAB
CHOLESTEROL, TOTAL: 192 MG/DL (ref 0–199)
HCT VFR BLD CALC: 38.1 % (ref 36–48)
HDLC SERPL-MCNC: 81 MG/DL (ref 40–60)
HEMOGLOBIN: 13.3 G/DL (ref 12–16)
LDL CHOLESTEROL CALCULATED: 101 MG/DL
LV EF: 58 %
LVEF MODALITY: NORMAL
MCH RBC QN AUTO: 36.3 PG (ref 26–34)
MCHC RBC AUTO-ENTMCNC: 35 G/DL (ref 31–36)
MCV RBC AUTO: 103.7 FL (ref 80–100)
PDW BLD-RTO: 13 % (ref 12.4–15.4)
PLATELET # BLD: 234 K/UL (ref 135–450)
PMV BLD AUTO: 7.9 FL (ref 5–10.5)
RBC # BLD: 3.67 M/UL (ref 4–5.2)
TRIGL SERPL-MCNC: 51 MG/DL (ref 0–150)
VLDLC SERPL CALC-MCNC: 10 MG/DL
WBC # BLD: 3.8 K/UL (ref 4–11)

## 2020-10-06 PROCEDURE — 36415 COLL VENOUS BLD VENIPUNCTURE: CPT

## 2020-10-06 PROCEDURE — 6360000002 HC RX W HCPCS: Performed by: INTERNAL MEDICINE

## 2020-10-06 PROCEDURE — 92610 EVALUATE SWALLOWING FUNCTION: CPT

## 2020-10-06 PROCEDURE — 2580000003 HC RX 258: Performed by: INTERNAL MEDICINE

## 2020-10-06 PROCEDURE — 97161 PT EVAL LOW COMPLEX 20 MIN: CPT

## 2020-10-06 PROCEDURE — 93880 EXTRACRANIAL BILAT STUDY: CPT

## 2020-10-06 PROCEDURE — 85027 COMPLETE CBC AUTOMATED: CPT

## 2020-10-06 PROCEDURE — 96372 THER/PROPH/DIAG INJ SC/IM: CPT

## 2020-10-06 PROCEDURE — 92526 ORAL FUNCTION THERAPY: CPT

## 2020-10-06 PROCEDURE — 93306 TTE W/DOPPLER COMPLETE: CPT

## 2020-10-06 PROCEDURE — G0378 HOSPITAL OBSERVATION PER HR: HCPCS

## 2020-10-06 PROCEDURE — 80061 LIPID PANEL: CPT

## 2020-10-06 PROCEDURE — 6370000000 HC RX 637 (ALT 250 FOR IP): Performed by: INTERNAL MEDICINE

## 2020-10-06 PROCEDURE — 83036 HEMOGLOBIN GLYCOSYLATED A1C: CPT

## 2020-10-06 PROCEDURE — 92523 SPEECH SOUND LANG COMPREHEN: CPT

## 2020-10-06 PROCEDURE — 97165 OT EVAL LOW COMPLEX 30 MIN: CPT

## 2020-10-06 RX ORDER — ASPIRIN 81 MG/1
81 TABLET ORAL DAILY
Qty: 30 TABLET | Refills: 3 | Status: SHIPPED | OUTPATIENT
Start: 2020-10-07

## 2020-10-06 RX ORDER — ATORVASTATIN CALCIUM 10 MG/1
10 TABLET, FILM COATED ORAL DAILY
Qty: 30 TABLET | Refills: 1 | Status: SHIPPED
Start: 2020-10-06 | End: 2021-05-03 | Stop reason: DRUGHIGH

## 2020-10-06 RX ADMIN — Medication 10 ML: at 08:38

## 2020-10-06 RX ADMIN — ASPIRIN 81 MG: 81 TABLET, COATED ORAL at 08:37

## 2020-10-06 RX ADMIN — ENOXAPARIN SODIUM 40 MG: 40 INJECTION SUBCUTANEOUS at 08:37

## 2020-10-06 ASSESSMENT — PAIN SCALES - GENERAL
PAINLEVEL_OUTOF10: 0

## 2020-10-06 NOTE — PROGRESS NOTES
Patient to ER for expressive aphasia x 1 week. Reports on Saturday having this happen with an episode of L sided vision blurring and L sided headache. These are no long active symptoms. Hypertensive and told by PCP to come in. CT = negative. Admitted to ICU bed 1 for observation. Transported by wheel chair and 1 RN. Patient able to ambulate self to bed with no help/assisted device. Independent and ambulatory at baseline. Plan for MRI in morning. Patient is alert and oriented x4. Follows commands and demonstrates appropriate judgment, safety awareness, and attention/concentration. NIHSS of 0. On RA and sating 100%. Clear breath sounds throughout. NSR. Abdomen rounded and soft with active bowel sounds in all four quadrants. Skin intact without evidence of edema. Denies being in any pain. Is calm and cooperative. Ambulated to bathroom to void and brush her teeth. Tolerated well. Will monitor closely.

## 2020-10-06 NOTE — ED NOTES
Bed: 14  Expected date:   Expected time:   Means of arrival: Walk In  Comments:     Santiago Ramirez RN  10/05/20 9437
Dr Charmaine Pérez at bedside. Pt medicated per orders. Updated on plan of care. Patient resting comfortably with no signs of distress. Denies any needs at this time. Bed locked and in lowest position with both side rails raised. Call light within reach.      Melina Wise RN  10/05/20 0079
Dr Noemi Palmer at bedside.      Axel Salguero RN  10/05/20 2313
Patient resting comfortably with no signs of distress. Denies any needs at this time. Bed locked and in lowest position with both side rails raised. Call light within reach. Family at bedside.      Philip Forte RN  10/05/20 5133
Pt alert and oriented, Pt to Er with c/o hypertension for a few months, states has been dealing with sciatica. Pt states was told by PCP that she needed to come in for MRI. Pt also reports periods of weakness suddenly and difficulty finding her words. Pt denies fall or hitting her head, denies symptoms at this time. Perrla intact. Pt denies pain at this time. Pt states does not take meds for BP. Pt denies any other problems or needs at this time.        Lori Morejon RN  10/05/20 0492
Pt ambulated to restroom by self. Meal tray ordered for patient. Patient resting comfortably with no signs of distress. Denies any needs at this time. Bed locked and in lowest position with both side rails raised. Call light within reach.      Shawna Galvin RN  10/05/20 7640
Pt back from Ct. Back in bed and back on monitor. Patient resting comfortably with no signs of distress. Denies any needs at this time. Bed locked and in lowest position with both side rails raised. Call light within reach.      Leigha Gamboa RN  10/05/20 1889
This RN gve report to RN on ICE. Nothing infusing upon transfer. VSS. No symptoms of distress noted. All belongings with pt to the floor,.       Pilar Rodriguez RN  10/05/20 2106
drop 2 (two) times daily.

## 2020-10-06 NOTE — PROGRESS NOTES
Reassessment complete, VSS, monitor NSR, O2 sat remains > 90% on RA. Pt up in chair. Will continue to monitor.

## 2020-10-06 NOTE — PROGRESS NOTES
4 hour shift and NIHSS reassessment. Patient scored a 0. In and out of sleep. Easily aroused by verbal stimuli. No acute changes noted. VSS. Afebrile. See Flowsheets. Denies being in any pain. No needs requested at this time. Will continue to monitor.

## 2020-10-06 NOTE — PROGRESS NOTES
Pt discharged home. Pt provided with home care and follow-up instructions and medication instructions. Pt stated follow up will take place with PCP in two weeks.

## 2020-10-06 NOTE — DISCHARGE SUMMARY
doppler noted   A plaque was found in the Left Prox ICA.         The plaque characteristics are: low echogenicity, minimal severity and    heterogeneous texture. Her cholesterol is good and her HDL is 80 LDL is 101  I am starting her on low dose Lipitor for plaque stabilization and I suspect that she can stop taking this after following up the her PCP   Additionally I have started Aspirin 81 mg po daily. The patient's symptoms have completely resolved so I have cancelled her MRI as I will treat this as a TIA eitherway.       Discharge Medications:   Current Discharge Medication List      START taking these medications    Details   aspirin 81 MG EC tablet Take 1 tablet by mouth daily  Qty: 30 tablet, Refills: 3      atorvastatin (LIPITOR) 10 MG tablet Take 1 tablet by mouth daily  Qty: 30 tablet, Refills: 1           Current Discharge Medication List        Current Discharge Medication List      CONTINUE these medications which have NOT CHANGED    Details   lisinopril (PRINIVIL;ZESTRIL) 5 MG tablet Take 5 mg by mouth daily      LORazepam (ATIVAN) 1 MG tablet Take 1 mg by mouth every 8 hours as needed for Anxiety.       Multiple Vitamin (MULTIVITAMIN) tablet Take 1 tablet by mouth      Calcium 500-125 MG-UNIT TABS Take 1 tablet by mouth      levothyroxine (SYNTHROID) 75 MCG tablet Take 75 mcg by mouth nightly       alendronate (FOSAMAX) 70 MG tablet Take 70 mg by mouth every 7 days      latanoprost (XALATAN) 0.005 % ophthalmic solution 1 drop nightly           Current Discharge Medication List          Discharge ROS:  A complete review of systems was asked and negative except for wanting to go home     Discharge Exam:    BP (!) 149/91   Pulse 89   Temp 97.6 °F (36.4 °C) (Temporal)   Resp 24   Ht 5' 4\" (1.626 m)   Wt 114 lb 13.8 oz (52.1 kg)   SpO2 97%   BMI 19.72 kg/m²   General appearance:  NAD  HEENT:   Normal cephalic, atraumatic, moist mucous membranes, no oropharyngeal erythema or exudate  Neck: Supple, trachea midline, no anterior cervical or SC LAD  Heart[de-identified] Normal s1/s2, RRR, no murmurs, gallops, or rubs  Lungs:  Clear to auscultation, bilaterally without Rales/Wheezes/Rhonchi. Abdomen: Soft, non-tender, non-distended, bowel sounds present, no masses  Musculoskeletal:  No clubbing, no cyanosis, no edema  Skin: No lesion or masses  Neurologic:  Neurovascularly intact without any focal sensory/motor deficits. Cranial nerves: II-XII intact, grossly non-focal.  Psychiatric:  A & O x3  Neuro: Grossly intact, moves all four extremities     Labs: For convenience and continuity at follow-up the following most recent labs are provided:    Lab Results   Component Value Date    WBC 3.8 10/06/2020    HGB 13.3 10/06/2020    HCT 38.1 10/06/2020    .7 10/06/2020     10/06/2020     10/05/2020    K 4.3 10/05/2020    K 3.8 03/30/2018    CL 97 10/05/2020    CO2 24 10/05/2020    BUN 18 10/05/2020    CREATININE 0.8 10/05/2020    CALCIUM 9.7 10/05/2020    ALKPHOS 73 03/29/2018    ALT 26 03/29/2018    AST 35 03/29/2018    BILITOT 0.4 03/29/2018    BILIDIR <0.2 03/29/2018    LABALBU 4.2 03/29/2018    LDLCALC 101 10/06/2020    TRIG 51 10/06/2020     Lab Results   Component Value Date    INR 1.02 10/05/2020       Radiology:  Ct Head Wo Contrast    Result Date: 10/5/2020  EXAMINATION: CT OF THE HEAD WITHOUT CONTRAST  10/5/2020 2:33 pm TECHNIQUE: CT of the head was performed without the administration of intravenous contrast. Dose modulation, iterative reconstruction, and/or weight based adjustment of the mA/kV was utilized to reduce the radiation dose to as low as reasonably achievable. COMPARISON: CTA head same date.  HISTORY: ORDERING SYSTEM PROVIDED HISTORY: Neuro symptoms Saturday no current symptoms with elevated blood pressure TECHNOLOGIST PROVIDED HISTORY: Reason for exam:->Neuro symptoms Saturday no current symptoms with elevated blood pressure Has a \"code stroke\" or \"stroke alert\" been called?-> no Reason for Exam: elevated bp, neuro symptoms Acuity: Acute Type of Exam: Initial FINDINGS: BRAIN/VENTRICLES: There is no acute intracranial hemorrhage, mass effect or midline shift. No abnormal extra-axial fluid collection. The gray-white differentiation is maintained without evidence of an acute infarct. There is no evidence of hydrocephalus. ORBITS: The visualized portion of the orbits demonstrate no acute abnormality. SINUSES: The visualized paranasal sinuses and mastoid air cells demonstrate no acute abnormality. SOFT TISSUES/SKULL:  No acute abnormality of the visualized skull or soft tissues. No acute intracranial abnormality. Xr Chest Portable    Result Date: 10/5/2020  EXAMINATION: ONE XRAY VIEW OF THE CHEST 10/5/2020 2:34 pm COMPARISON: March 29, 2018 HISTORY: ORDERING SYSTEM PROVIDED HISTORY: CP TECHNOLOGIST PROVIDED HISTORY: Reason for exam:->CP Reason for Exam: Hypertension (pt states having high blood pressure- for several days- states Saturday- she \"couldn't find words\" for a few seconds, felt \"dull pain behind left ear for one hour\" blurry vision for short period of time as well on Saturday- none of these symptoms today- denies numbness. ) Acuity: Unknown Type of Exam: Unknown FINDINGS: Normal cardiac size. No evidence of pneumonia, edema or other acute pulmonary process. No evidence of acute process of the cardiac or mediastinal structures. No evidence of pneumothorax or pleural effusion. No evidence of acute cardiopulmonary disease.      Vascular Carotid Duplex Bilateral    Result Date: 10/6/2020  Carotid Duplex Study  Demographics   Patient Name      Sindey Damico   Date of Study     10/06/2020         Gender              Female   Patient Number    [id number]         Date of Birth       1950   Visit Number      [id number]          Age                 65 year(s)   Accession Number  [id number]         Room Number         3663   Corporate ID      [id number]           Sonographer Becky Lebron                                                           S   Ordering          Heidi Solares MD Interpreting        [facility] Vascular  Physician                            Physician           Indio Hernandez MD,                                                           [facility]  Procedure Type of Study:   Cerebral:Carotid, VL CAROTID DUPLEX BILATERAL. Vascular Sonographer Report  Additional Indications:Transient ischemic attack Impressions Right Impression The right internal carotid artery demonstrates no significant stenosis. The right vertebral artery demonstrates normal antegrade flow. The right subclavian artery is visualized and demonstrates multiphasic flow. Left Impression The left internal carotid artery appears to have a minimal <50% diameter reducing stenosis based on velocity criteria. The left vertebral artery demonstrates normal antegrade flow. The left subclavian artery is visualized and demonstrates multiphasic flow. Conclusions   Summary   -The right internal carotid artery demonstrates no significant stenosis. -The left internal carotid artery appears to have a minimal <50% diameter  reducing stenosis based on velocity criteria. Signature   ------------------------------------------------------------------  Electronically signed by Indio Hernandez MD, [facility] (Interpreting  physician) on 10/06/2020 at 01:36 PM  ------------------------------------------------------------------  Patient Status:Routine. [address] [place] [address] Vascular Lab. Technical Quality:Adequate visualization. Plaque   - A plaque was found in the Left Prox ICA. The plaque characteristics are: low echogenicity, minimal severity and heterogeneous texture. Velocities are measured in cm/s ; Diameters are measured in mm Carotid Right Measurements +---------------+----+----+-----+----+ ! Location       ! PSV ! EDV ! Angle! RI  ! +---------------+----+----+-----+----+ ! Prox CCA       !91.9!23. 6! 60   !0.74! +---------------+----+----+-----+----+ ! Mid CCA        !69  !21. 1! 60   !0.69! +---------------+----+----+-----+----+ ! Dist CCA       !73. 3!24. 9!60   !0.66! +---------------+----+----+-----+----+ ! Prox ICA       !54. 3!47. 1! 60   !0.56! +---------------+----+----+-----+----+ ! Mid ICA        !66. 1!62  !60   !0.59! +---------------+----+----+-----+----+ ! Dist ICA       !84.5!31. 9!60   !0.62! +---------------+----+----+-----+----+ ! Prox ECA       !81.4!    !60   !    ! +---------------+----+----+-----+----+ ! Vertebral      !33.3!    !60   !    ! +---------------+----+----+-----+----+ ! Prox Subclavian!94.4!    !61   !    ! +---------------+----+----+-----+----+   - There is antegrade vertebral flow noted on the right side. - Additional Measurements:ICAPSV/CCAPSV 1.22. ICAEDV/CCAEDV 1.35. Carotid Left Measurements +---------------+----+----+-----+----+ ! Location       ! PSV ! EDV ! Angle! RI  ! +---------------+----+----+-----+----+ ! Prox CCA       ![id number]. 7!60   !0.67! +---------------+----+----+-----+----+ ! Mid CCA        !82. 3!29  !60   !0.65! +---------------+----+----+-----+----+ ! Dist CCA       !79. 0!50. 9!60   !0.67! +---------------+----+----+-----+----+ ! Prox ICA       !72. 9!25. 1! 60   !0.66! +---------------+----+----+-----+----+ ! Mid ICA        !79. 8!24. 2! 60   !0.51! +---------------+----+----+-----+----+ ! Dist ICA       !57. 5!46. 3!46   !0.49! +---------------+----+----+-----+----+ ! Prox ECA       !69.8!    !60   !    ! +---------------+----+----+-----+----+ ! Vertebral      !65.9!    !60   !    ! +---------------+----+----+-----+----+ ! Prox Subclavian! 141 !    !60   !    ! +---------------+----+----+-----+----+   - There is antegrade vertebral flow noted on the left side. - Additional Measurements:ICAPSV/CCAPSV 0.96. ICAEDV/CCAEDV 1.73.     Cta Head Neck W Contrast    Result Date: 10/5/2020  EXAMINATION: CTA OF THE HEAD AND NECK WITH CONTRAST 10/5/2020 2:08 pm: TECHNIQUE: CTA of the head and neck was performed with the administration of intravenous contrast. Multiplanar reformatted images are provided for review. MIP images are provided for review. Stenosis of the internal carotid arteries measured using NASCET criteria. Dose modulation, iterative reconstruction, and/or weight based adjustment of the mA/kV was utilized to reduce the radiation dose to as low as reasonably achievable. 3D surface reformatted and/or curved MIP images were performed submitted for review. These images confirm the findings in the original report. COMPARISON: None. HISTORY: ORDERING SYSTEM PROVIDED HISTORY: expressive aphasia saturday. TECHNOLOGIST PROVIDED HISTORY: Reason for exam:->expressive aphasia saturday. Reason for Exam: hypertension Acuity: Acute Type of Exam: Initial FINDINGS: CTA NECK: AORTIC ARCH/ARCH VESSELS: No dissection or arterial injury. No significant stenosis of the brachiocephalic or subclavian arteries. CAROTID ARTERIES: No dissection, arterial injury, or hemodynamically significant stenosis by NASCET criteria. VERTEBRAL ARTERIES: No dissection, arterial injury, or significant stenosis. SOFT TISSUES: The lung apices are clear. No cervical or superior mediastinal lymphadenopathy. The larynx and pharynx are unremarkable. No acute abnormality of the salivary and thyroid glands. BONES: No acute osseous abnormality. CTA HEAD: ANTERIOR CIRCULATION: No significant stenosis of the intracranial internal carotid, anterior cerebral, or middle cerebral arteries. No aneurysm. POSTERIOR CIRCULATION: No significant stenosis of the vertebral, basilar, or posterior cerebral arteries. No aneurysm. Persistent fetal origin left posterior cerebral artery OTHER: No dural venous sinus thrombosis on this non-dedicated study. BRAIN: No mass effect or midline shift. No extra-axial fluid collection. The gray-white differentiation is maintained. Unremarkable CTA of the head and neck.        EKG     Rhythm: normal sinus   Rate: normal  Clinical Impression: no acute changes        The patient was seen and examined on day of discharge and this discharge summary is in conjunction with any daily progress note from day of discharge. Time Spent on discharge is 30 minutes  in the examination, evaluation, counseling and review of medications and discharge plan. Note that greater  than 30 minutes was spent in preparing discharge papers, discussing discharge with patient, medication review, etc.       Signed:    Jarrett Beltran MD   10/6/2020      Thank you LYNDON Solorzano MD for the opportunity to be involved in this patient's care.  If you have any questions or concerns please feel free to contact me at [phone]

## 2020-10-06 NOTE — CARE COORDINATION
Discharge planning note:    Chart reviewed and it appears that patient has minimal needs for discharge at this time. Discussed with patients nurse and requested that case management be notified if discharge needs are identified. Discharge order on chart. Low risk of re-hospitalization (11%). Case management will continue to follow progress and update discharge plan as needed.     Gerri Loco RN BSN  Case Management  [phone]

## 2020-10-06 NOTE — DISCHARGE INSTR - COC
Continuity of Care Form    Patient Name: Kirit Pathak   :  1950  MRN:  [id number]    Admit date:  10/5/2020  Discharge date:  ***    Code Status Order: Full Code   Advance Directives:   Advance Care Flowsheet Documentation     Date/Time Healthcare Directive Type of Healthcare Directive Copy in [address] Agent's Name Healthcare Agent's Phone Number    10/05/20 9994  Yes, patient has an advance directive for healthcare treatment  Durable power of  for health care;Living will  No, copy requested from family  --  --  --          Admitting Physician:  Jeanne Mobley MD  PCP: LYNDON Yeung MD    Discharging Nurse: [place] [facility] Unit/Room#: OEG-8573/4228-58  Discharging Unit Phone Number: ***    Emergency Contact:   Extended Emergency Contact Information  Primary Emergency Contact: Maxwell Whitley  Address: [address] [place] [address], [address]  Home Phone: [phone]  Mobile Phone: [phone]  Relation: Spouse  Secondary Emergency Contact: EDITH Gomez [address] Phone: [phone]  Relation: Child    Past Surgical History:  Past Surgical History:   Procedure Laterality Date    BLEPHAROPLASTY Left     DENTAL SURGERY      implant       Immunization History: There is no immunization history on file for this patient.     Active Problems:  Patient Active Problem List   Diagnosis Code    Syncope and collapse R55    Hyponatremia E87.1    Hypothyroidism E03.9    Glaucoma H40.9    TIA (transient ischemic attack) G45.9       Isolation/Infection:   Isolation          No Isolation        Patient Infection Status     None to display          Nurse Assessment:  Last Vital Signs: BP (!) 149/91   Pulse 89   Temp 97.6 °F (36.4 °C) (Temporal)   Resp 24   Ht 5' 4\" (1.626 m)   Wt 114 lb 13.8 oz (52.1 kg)   SpO2 97%   BMI 19.72 kg/m²     Last documented pain score (0-10 scale): Pain Level: 0  Last Weight:   Wt Readings from Last 1 Encounters:   10/06/20 114 lb 13.8 oz (52.1 kg)     Mental Status:  {IP PT MENTAL STATUS:}    IV Access:  [address] IV ACCESS:802987712}    Nursing Mobility/ADLs:  Walking   {CHP DME WNOO:054333873}  Transfer  {CHP DME IOML:659882016}  Bathing  {CHP DME YMQC:908774368}  Dressing  {CHP DME URFO:360209276}  Toileting  {CHP DME YIQU:093120632}  Feeding  {CHP DME MHAI:862886793}  Med Admin  {CHP DME IGLR:221373186}  Med Delivery   {[facility] MED Delivery:942615613}    Wound Care Documentation and Therapy:        Elimination:  Continence:   · Bowel: {YES / FATIMAH:21076}  · Bladder: {YES / OT:60638}  Urinary Catheter: {Urinary Catheter:917612516}   Colostomy/Ileostomy/Ileal Conduit: {YES / NN:21797}       Date of Last BM: ***    Intake/Output Summary (Last 24 hours) at 10/6/2020 1616  Last data filed at 10/5/2020 2249  Gross per 24 hour   Intake 10 ml   Output --   Net 10 ml     I/O last 3 completed shifts:   In: 10 [I.V.:10]  Out: -     Safety Concerns:     [address] [employer] Safety Concerns:277195567}    Impairments/Disabilities:      [address] [employer] Impairments/Disabilities:151970111}    Nutrition Therapy:  Current Nutrition Therapy:   [address] Diet List:052497360}    Routes of Feeding: {P DME Other Feedings:342869250}  Liquids: {Slp liquid thickness:41441}  Daily Fluid Restriction: {CHP DME Yes amt example:792100110}  Last Modified Barium Swallow with Video (Video Swallowing Test): {Done Not Done DDGV:528189353}    Treatments at the Time of Hospital Discharge:   Respiratory Treatments: ***  Oxygen Therapy:  {Therapy; copd oxygen:55179}  Ventilator:    {[facility] Vent NQLS:896310049}    Rehab Therapies: {THERAPEUTIC INTERVENTION:3840068256}  Weight Bearing Status/Restrictions: [address] Weight Bearin}  Other Medical Equipment (for information only, NOT a DME order):  {EQUIPMENT:308022399}  Other Treatments: ***    Patient's personal belongings (please select all that are sent with patient):  {P DME Belongings:915744571}    RN SIGNATURE:  {Esignature:448713316}    CASE MANAGEMENT/SOCIAL WORK SECTION    Inpatient Status Date: ***    Readmission Risk Assessment Score:  Readmission Risk              Risk of Unplanned Readmission:        11           Discharging to Facility/ Agency   · Name:   · Address:  · Phone:  · Fax:    Dialysis Facility (if applicable)   · Name:  · Address:  · Dialysis Schedule:  · Phone:  · Fax:    / signature: {Esignature:806306355}    PHYSICIAN SECTION    Prognosis: {Prognosis:4802262009}    Condition at Discharge: [address] [place] [address] Patient Condition:994793447}    Rehab Potential (if transferring to Rehab): {Prognosis:1035829522}    Recommended Labs or Other Treatments After Discharge: ***    Physician Certification: I certify the above information and transfer of Early Number  is necessary for the continuing treatment of the diagnosis listed and that she requires {Admit to Appropriate Level of Care:21042} for {GREATER/LESS:242914742} 30 days.      Update Admission H&P: {CHP DME Changes in [facility]:725059603}    PHYSICIAN SIGNATURE:  {Esignature:451431775}

## 2020-10-06 NOTE — PROGRESS NOTES
Occupational Therapy   Occupational Therapy Initial Assessment/Discharge Summary  Date: 10/6/2020   Patient Name: Laney Anderson  MRN: [id number]     : 1950    Date of Service: 10/6/2020    Discharge Recommendations: Laney Anderson scored a 24/24 on the AM-[facility] ADL Inpatient form. At this time, no further OT is recommended upon discharge due to patient being at baseline function. Recommend patient returns to prior setting with prior services. OT Equipment Recommendations  Equipment Needed: No    Assessment   Assessment: pt is at baseline function and does not require skilled OT services. Pt reports feeling safe with discharging home  Decision Making: Low Complexity  OT Education: OT Role;Plan of Care  Patient Education: pt is an independent learner  REQUIRES OT FOLLOW UP: No  Activity Tolerance  Activity Tolerance: Patient Tolerated treatment well  Safety Devices  Safety Devices in place: Yes  Type of devices: Call light within reach;Nurse notified; Left in chair;Gait belt  Restraints  Initially in place: No           Patient Diagnosis(es): The primary encounter diagnosis was TIA (transient ischemic attack). A diagnosis of Uncontrolled hypertension was also pertinent to this visit. has a past medical history of Bulging lumbar disc, Degenerative disc disease, lumbar, and Thyroid disease. has a past surgical history that includes Blepharoplasty (Left) and Dental surgery. Restrictions  Restrictions/Precautions  Restrictions/Precautions: Fall Risk(Medium fall risk)  Required Braces or Orthoses?: No  Position Activity Restriction  Other position/activity restrictions: Laney Anderson is a 65 y.o. female who presents to the emergency department with chief complaint of expressive aphasia-word searching, left-sided headache and vision changes. Last occurred on Friday. Since then she is had elevated blood pressure despite taking her blood pressure medications.   No chest pain shortness of breath or current headache. Her headache the other day when the symptoms were occurring worse left-sided parietal achy 4/10. No radiation. No alleviating or aggravating factors. No fever or neck stiffness    Subjective   General  Chart Reviewed: Yes  Family / Caregiver Present: No  Diagnosis: TIA  Subjective  Subjective: pt supine in bed upon arrival, agreeable to therapy. Patient Currently in Pain: Denies  Pain Assessment  Pain Assessment: 0-10  Pain Level: 0  Patient's Stated Pain Goal: No pain  Vital Signs  Pulse: 83  Resp: 18  BP: 122/73  MAP (mmHg): 86  Patient Currently in Pain: Denies  Social/Functional History  Social/Functional History  Lives With: Spouse()  Type of Home: House  Home Layout: Two level, Bed/Bath upstairs, Laundry in basement  Home Access: Stairs to enter without rails  Entrance Stairs - Number of Steps: 2 steps to enter  Bathroom Shower/Tub: Walk-in shower  Bathroom Toilet: Standard(tall)  Bathroom Equipment: Shower chair  Home Equipment: Cane, 4 wheeled walker(Doesn't currently use them)  Receives Help From: Family(spouse primarily, daughter can if needed)  ADL Assistance: Independent  Homemaking Assistance: Independent  Homemaking Responsibilities: Yes  Ambulation Assistance: Independent  Transfer Assistance: Independent  Active : Yes  Mode of Transportation: Car  Occupation: Retired  Type of occupation: teacher  Leisure & Hobbies: visiting family, reading, walk new dog  Additional Comments: no falls in past 6 months       Objective   Vision: Impaired  Vision Exceptions: Wears glasses for reading  Hearing: Within functional limits    Orientation  Overall Orientation Status: Within Functional Limits     Balance  Sitting Balance: Independent  Standing Balance: Independent  Functional Mobility  Functional - Mobility Device: No device  Activity: Other  Assist Level:  Independent  Functional Mobility Comments: pt ambulated around unit multiple times without signs of fatigue or discomfort  ADL  Additional Comments: pt declined ADL needs at this time  Tone RUE  RUE Tone: Normotonic  Tone LUE  LUE Tone: Normotonic  Coordination  Movements Are Fluid And Coordinated: Yes     Bed mobility  Supine to Sit: Independent  Scooting: Independent  Transfers  Sit to stand: Independent  Stand to sit: Independent     Cognition  Overall Cognitive Status: WFL  Perception  Overall Perceptual Status: WFL              LUE AROM (degrees)  LUE AROM : WNL  RUE AROM (degrees)  RUE AROM : WNL  LUE Strength  Gross LUE Strength: WFL  RUE Strength  Gross RUE Strength: WFL                   Plan   Plan  Times per week: discharge to home    AM-PAC Score        AM-PAC Inpatient Daily Activity Raw Score: 24 (10/06/20 1123)  AM-PAC Inpatient ADL T-Scale Score : 57.54 (10/06/20 1123)  ADL Inpatient CMS 0-100% Score: 0 (10/06/20 1123)  ADL Inpatient CMS G-Code Modifier : [facility] (10/06/20 1123)    Goals  Patient Goals   Patient goals : no goals needed at this time       Therapy Time   Individual Concurrent Group Co-treatment   Time In 1045         Time Out 1106         Minutes 21              Timed Code Treatment Minutes:   6 minutes    Total Treatment Minutes:  21 minutes    BERRY Valverde  OT provided direct supervision to student, facilitated in making skilled judgements throughout duration of session.     HUMBERTO Rosenthal OTR/L [id number]     Natalie Sage OT

## 2020-10-06 NOTE — PROGRESS NOTES
Carmen Zarco 25 or Facility: [facility]   From: Lauryn Viera   RE: Kari Joe 1950 RM: [id number]     Patient admitted to ICU for possible TIA/observation. DANIEL.

## 2020-10-06 NOTE — PROGRESS NOTES
0400 assessment complete. No acute changes noted. 0 on NIHSS. VSS. Afebrile. See Flowsheets. Denies being in any pain. Bed in lowest position with wheels locked. Alarm activated. 3/4 side rails up. Non-skid socks on. Call light within reach. All requested needs provided. Repositioned for comfort and the prevention of pressure ulcer formation.

## 2020-10-06 NOTE — PROGRESS NOTES
Referral received for spiritual support. Patient somewhat anxious R/T pending MRI. Provided reassurance, encouragement and prayer. Patient appreciative, has positive attitude. Hopes to be discharged yet today.

## 2020-10-06 NOTE — PROGRESS NOTES
Speech Language/Pathology   SPEECH LANGUAGE AND CLINICAL BEDSIDE SWALLOWING EVALUATION   Speech Therapy Department     Patient Name:  Jesse Cary  :  1950  Pain level: Pt denies pain at this time  Medical Diagnosis:  TIA (transient ischemic attack) [G45.9]  TIA (transient ischemic attack) [G45.9]     Pro Rangel is a 65 y.o.  female with history of hypothyroidism, sciatica who presented with elevated blood pressures today morning, and difficulty with finding words 2 days ago. Patient is accompanied by her daughter at bedside who is involved in her care. Patient reports that she had difficulty finding words associated with pain behind her left ear, blurry vision on Saturday, which lasted for less than a minute. Patient stayed home. Today morning she reports her blood pressures being as high as 180/110. Patient denies history of hypertension. She was ED in 2020 with back pain, diagnosed with sciatica. Patient used Norco and Robaxin for a few days and followed up with Dr. Roxann Phelps from pain management. She had pain resolved after she underwent physical therapy and epidurals x2. Currently not taking any pain medications. Patient currently denies symptoms including chest pain, weakness, blurry vision, pain abdomen, nausea or vomiting.   In ED, patient's blood pressures are normal range. Hemodynamically stable. Patient got dose of aspirin 181 mg p.o. x1 administered by EMS prior to arrival to ED. CT head and CTA neck showed no acute abnormalities. Patient is being admitted under hospitalist service for work-up and management of TIA. MRI has been ordered but has not yet been completed    Speech Language and Clinical Swallow Evaluation orders received. Pt is currently alert and verbally responsive. Pt does acknowledge \"word finding\" and an inability to \"get out what I wanted to say\" upon admission. However, Pt reports that her symptoms have largely resolved at this time. Pt reports no overt speech or swallowing difficulty prior to admission but does report occasional coughing with liquids and occasional sensation of difficulty getting pills down, prior to admission    CLINICAL SWALLOW EVALUATION:  Dysphagia Treatment Diagnosis: Mild Oropharyngeal Dysphagia     Impressions: Pt demonstrates adequate oral motor strength and ROM for completion of OME. No overt facial asymmetry noted. Mild reduced bolus control with rapid and premature bolus loss to pharynx noted with thin liquids. Immediate cough, consistent with penetration/aspiration noted with thin liquids via tsp only. Improved airway protection but \"audible\" gulp swallows, consistent with premature bolus loss to pharynx, noted with thin liquids. Mild/mod reduced laryngeal excursion with suspected reduced pharyngeal clearing noted with increased textures. Although no overt signs of aspiration noted with any texture, premature bolus loss to pharynx with thins and suspected reduced pharyngeal clearing with solids increase aspiration potential if precautions are not followed. Dietary Recommendations: Regular texture diet with thin liquids/no straws/meds with puree    Strategies: 90 degree positioning with all p.o. intake; small bites/sips; alternate textures through meal; reduce rate of intake; No straws     Dysphagia Treatment/Goals: Speech therapy for dysphagia tx 1-2 sessions to ensure diet tolerance  ST.) Pt will tolerate recommended diet without s/s of aspiration      SPEECH LANGUAGE EVALUATION:  Speech Language Treatment Diagnosis:  No overt Speech Language or Cognitive Linguistic Deficits noted    Speech Language Impressions: Pt alert and verbally responsive. Pt reports current Speech Language and cognitive function appears largely consistent with baseline function.     Oral Motor exam/Motor Speech:  · No facial asymmetry with adequate oral motor strength and ROM  · No overt dysarthria or apraxia noted in isolation or in connected speech     Verbal Expression:   · No overt expressive aphasia noted  · Confrontational naming, Responsive Naming, and Divergent naming: largely WFL  · Pt able to functionally demonstrate adequate thought organization for self expression for descriptive and self expressive tasks    Auditory Comprehension:   · No overt receptive aphasia noted  · Basic Command and Basic Y/N questions: WFL  · Complex commands: 80%  · Complex Y/N questions: 90%    Cognitive-Linguistic:   · Orientation: WFL  · Immediate recall: 100% (f=4)  · Short term recall: 75% spontaneous; 100% with mild cues  · Verbal problem solving : largely [facility] for functional tasks    Plan: Speech therapy 1-2 sessions for dysphagia treatment     Discharge Recommendations:  No further Speech/Dysphagia Treatment is indicated at discharge    Patient/Family Education:Education, results and recommendations given to the Pt and nurse, who verbalized understanding    Timed Code Treatment: 0 min    Total Treatment Time: 40 min     If patient discharges prior to next session this note will serve as a discharge summary.      Duran Sullivan [facility]#[id number]   Speech-Language Pathologist

## 2020-10-06 NOTE — PROGRESS NOTES
Patient called out appropriately. Ambulated to the bathroom and back without needing assistance. Repositioned with pillow support for comfort. She is able to turn herself in bed and educated on the importance of frequent repositioning for the prevention of pressure ulcer formation. Verbalized understanding. Bed alarm activated.

## 2020-10-07 LAB
ESTIMATED AVERAGE GLUCOSE: 93.9 MG/DL
HBA1C MFR BLD: 4.9 %

## 2021-05-03 ENCOUNTER — HOSPITAL ENCOUNTER (INPATIENT)
Age: 71
LOS: 2 days | Discharge: HOME | DRG: 392 | End: 2021-05-05
Attending: EMERGENCY MEDICINE | Admitting: INTERNAL MEDICINE
Payer: MEDICARE

## 2021-05-03 ENCOUNTER — APPOINTMENT (OUTPATIENT)
Dept: GENERAL RADIOLOGY | Age: 71
DRG: 392 | End: 2021-05-03
Payer: MEDICARE

## 2021-05-03 ENCOUNTER — APPOINTMENT (OUTPATIENT)
Dept: CT IMAGING | Age: 71
DRG: 392 | End: 2021-05-03
Payer: MEDICARE

## 2021-05-03 DIAGNOSIS — R93.5 ABNORMAL ABDOMINAL CT SCAN: ICD-10-CM

## 2021-05-03 DIAGNOSIS — R55 NEAR SYNCOPE: Primary | ICD-10-CM

## 2021-05-03 DIAGNOSIS — R00.1 SINUS BRADYCARDIA: ICD-10-CM

## 2021-05-03 LAB
A/G RATIO: 1.5 (ref 1.1–2.2)
ALBUMIN SERPL-MCNC: 3.9 G/DL (ref 3.4–5)
ALP BLD-CCNC: 53 U/L (ref 40–129)
ALT SERPL-CCNC: 26 U/L (ref 10–40)
ANION GAP SERPL CALCULATED.3IONS-SCNC: 7 MMOL/L (ref 3–16)
AST SERPL-CCNC: 22 U/L (ref 15–37)
BASOPHILS ABSOLUTE: 0 K/UL (ref 0–0.2)
BASOPHILS RELATIVE PERCENT: 0.6 %
BILIRUB SERPL-MCNC: 0.6 MG/DL (ref 0–1)
BUN BLDV-MCNC: 15 MG/DL (ref 7–20)
CALCIUM SERPL-MCNC: 8.7 MG/DL (ref 8.3–10.6)
CHLORIDE BLD-SCNC: 104 MMOL/L (ref 99–110)
CO2: 27 MMOL/L (ref 21–32)
CREAT SERPL-MCNC: 0.8 MG/DL (ref 0.6–1.2)
EKG ATRIAL RATE: 85 BPM
EKG DIAGNOSIS: NORMAL
EKG P AXIS: 16 DEGREES
EKG P-R INTERVAL: 128 MS
EKG Q-T INTERVAL: 400 MS
EKG QRS DURATION: 80 MS
EKG QTC CALCULATION (BAZETT): 476 MS
EKG R AXIS: 13 DEGREES
EKG T AXIS: 38 DEGREES
EKG VENTRICULAR RATE: 85 BPM
EOSINOPHILS ABSOLUTE: 0.1 K/UL (ref 0–0.6)
EOSINOPHILS RELATIVE PERCENT: 0.9 %
GFR AFRICAN AMERICAN: >60
GFR NON-AFRICAN AMERICAN: >60
GLOBULIN: 2.6 G/DL
GLUCOSE BLD-MCNC: 100 MG/DL (ref 70–99)
HCT VFR BLD CALC: 40 % (ref 36–48)
HEMOGLOBIN: 13.7 G/DL (ref 12–16)
LIPASE: 30 U/L (ref 13–60)
LYMPHOCYTES ABSOLUTE: 0.7 K/UL (ref 1–5.1)
LYMPHOCYTES RELATIVE PERCENT: 9.3 %
MAGNESIUM: 1.9 MG/DL (ref 1.8–2.4)
MCH RBC QN AUTO: 34.4 PG (ref 26–34)
MCHC RBC AUTO-ENTMCNC: 34.1 G/DL (ref 31–36)
MCV RBC AUTO: 100.9 FL (ref 80–100)
MONOCYTES ABSOLUTE: 0.3 K/UL (ref 0–1.3)
MONOCYTES RELATIVE PERCENT: 4.6 %
NEUTROPHILS ABSOLUTE: 6 K/UL (ref 1.7–7.7)
NEUTROPHILS RELATIVE PERCENT: 84.6 %
PDW BLD-RTO: 12.9 % (ref 12.4–15.4)
PLATELET # BLD: 199 K/UL (ref 135–450)
PMV BLD AUTO: 7.8 FL (ref 5–10.5)
POTASSIUM SERPL-SCNC: 4.3 MMOL/L (ref 3.5–5.1)
PRO-BNP: 197 PG/ML (ref 0–124)
RBC # BLD: 3.97 M/UL (ref 4–5.2)
SODIUM BLD-SCNC: 138 MMOL/L (ref 136–145)
TOTAL PROTEIN: 6.5 G/DL (ref 6.4–8.2)
TROPONIN: <0.01 NG/ML
TROPONIN: <0.01 NG/ML
WBC # BLD: 7.1 K/UL (ref 4–11)

## 2021-05-03 PROCEDURE — 84443 ASSAY THYROID STIM HORMONE: CPT

## 2021-05-03 PROCEDURE — 96374 THER/PROPH/DIAG INJ IV PUSH: CPT

## 2021-05-03 PROCEDURE — 99222 1ST HOSP IP/OBS MODERATE 55: CPT | Performed by: SURGERY

## 2021-05-03 PROCEDURE — 2060000000 HC ICU INTERMEDIATE R&B

## 2021-05-03 PROCEDURE — 36415 COLL VENOUS BLD VENIPUNCTURE: CPT

## 2021-05-03 PROCEDURE — 84484 ASSAY OF TROPONIN QUANT: CPT

## 2021-05-03 PROCEDURE — 6370000000 HC RX 637 (ALT 250 FOR IP): Performed by: INTERNAL MEDICINE

## 2021-05-03 PROCEDURE — 93010 ELECTROCARDIOGRAM REPORT: CPT | Performed by: INTERNAL MEDICINE

## 2021-05-03 PROCEDURE — 2500000003 HC RX 250 WO HCPCS: Performed by: INTERNAL MEDICINE

## 2021-05-03 PROCEDURE — 99223 1ST HOSP IP/OBS HIGH 75: CPT | Performed by: INTERNAL MEDICINE

## 2021-05-03 PROCEDURE — 2500000003 HC RX 250 WO HCPCS: Performed by: PHYSICIAN ASSISTANT

## 2021-05-03 PROCEDURE — 85025 COMPLETE CBC W/AUTO DIFF WBC: CPT

## 2021-05-03 PROCEDURE — 74177 CT ABD & PELVIS W/CONTRAST: CPT

## 2021-05-03 PROCEDURE — 2580000003 HC RX 258: Performed by: PHYSICIAN ASSISTANT

## 2021-05-03 PROCEDURE — 83735 ASSAY OF MAGNESIUM: CPT

## 2021-05-03 PROCEDURE — 83690 ASSAY OF LIPASE: CPT

## 2021-05-03 PROCEDURE — 96375 TX/PRO/DX INJ NEW DRUG ADDON: CPT

## 2021-05-03 PROCEDURE — 83880 ASSAY OF NATRIURETIC PEPTIDE: CPT

## 2021-05-03 PROCEDURE — 71045 X-RAY EXAM CHEST 1 VIEW: CPT

## 2021-05-03 PROCEDURE — 6360000002 HC RX W HCPCS: Performed by: PHYSICIAN ASSISTANT

## 2021-05-03 PROCEDURE — 99285 EMERGENCY DEPT VISIT HI MDM: CPT

## 2021-05-03 PROCEDURE — 2580000003 HC RX 258: Performed by: INTERNAL MEDICINE

## 2021-05-03 PROCEDURE — 80053 COMPREHEN METABOLIC PANEL: CPT

## 2021-05-03 PROCEDURE — 93005 ELECTROCARDIOGRAM TRACING: CPT | Performed by: EMERGENCY MEDICINE

## 2021-05-03 PROCEDURE — 6360000004 HC RX CONTRAST MEDICATION: Performed by: EMERGENCY MEDICINE

## 2021-05-03 RX ORDER — SODIUM CHLORIDE 9 MG/ML
25 INJECTION, SOLUTION INTRAVENOUS PRN
Status: DISCONTINUED | OUTPATIENT
Start: 2021-05-03 | End: 2021-05-05 | Stop reason: HOSPADM

## 2021-05-03 RX ORDER — POLYETHYLENE GLYCOL 3350 17 G/17G
17 POWDER, FOR SOLUTION ORAL DAILY PRN
Status: DISCONTINUED | OUTPATIENT
Start: 2021-05-03 | End: 2021-05-05 | Stop reason: HOSPADM

## 2021-05-03 RX ORDER — ACETAMINOPHEN 325 MG/1
650 TABLET ORAL EVERY 6 HOURS PRN
Status: DISCONTINUED | OUTPATIENT
Start: 2021-05-03 | End: 2021-05-05 | Stop reason: HOSPADM

## 2021-05-03 RX ORDER — ESCITALOPRAM OXALATE 10 MG/1
5 TABLET ORAL DAILY
Status: DISCONTINUED | OUTPATIENT
Start: 2021-05-03 | End: 2021-05-05 | Stop reason: HOSPADM

## 2021-05-03 RX ORDER — 0.9 % SODIUM CHLORIDE 0.9 %
1000 INTRAVENOUS SOLUTION INTRAVENOUS ONCE
Status: COMPLETED | OUTPATIENT
Start: 2021-05-03 | End: 2021-05-03

## 2021-05-03 RX ORDER — SODIUM CHLORIDE, SODIUM LACTATE, POTASSIUM CHLORIDE, CALCIUM CHLORIDE 600; 310; 30; 20 MG/100ML; MG/100ML; MG/100ML; MG/100ML
INJECTION, SOLUTION INTRAVENOUS CONTINUOUS
Status: DISCONTINUED | OUTPATIENT
Start: 2021-05-03 | End: 2021-05-05

## 2021-05-03 RX ORDER — ESCITALOPRAM OXALATE 5 MG/1
5 TABLET ORAL DAILY
COMMUNITY
Start: 2021-02-08

## 2021-05-03 RX ORDER — SODIUM CHLORIDE 0.9 % (FLUSH) 0.9 %
5-40 SYRINGE (ML) INJECTION PRN
Status: DISCONTINUED | OUTPATIENT
Start: 2021-05-03 | End: 2021-05-05 | Stop reason: HOSPADM

## 2021-05-03 RX ORDER — ASPIRIN 81 MG/1
81 TABLET ORAL DAILY
Status: DISCONTINUED | OUTPATIENT
Start: 2021-05-03 | End: 2021-05-05 | Stop reason: HOSPADM

## 2021-05-03 RX ORDER — LEVOTHYROXINE SODIUM 0.07 MG/1
75 TABLET ORAL
Status: DISCONTINUED | OUTPATIENT
Start: 2021-05-04 | End: 2021-05-05 | Stop reason: HOSPADM

## 2021-05-03 RX ORDER — FENTANYL CITRATE 50 UG/ML
25 INJECTION, SOLUTION INTRAMUSCULAR; INTRAVENOUS ONCE
Status: COMPLETED | OUTPATIENT
Start: 2021-05-03 | End: 2021-05-03

## 2021-05-03 RX ORDER — HYDROCHLOROTHIAZIDE 25 MG/1
25 TABLET ORAL DAILY
COMMUNITY

## 2021-05-03 RX ORDER — ONDANSETRON 2 MG/ML
4 INJECTION INTRAMUSCULAR; INTRAVENOUS EVERY 6 HOURS PRN
Status: DISCONTINUED | OUTPATIENT
Start: 2021-05-03 | End: 2021-05-05 | Stop reason: HOSPADM

## 2021-05-03 RX ORDER — SODIUM CHLORIDE 0.9 % (FLUSH) 0.9 %
5-40 SYRINGE (ML) INJECTION EVERY 12 HOURS SCHEDULED
Status: DISCONTINUED | OUTPATIENT
Start: 2021-05-03 | End: 2021-05-05 | Stop reason: HOSPADM

## 2021-05-03 RX ORDER — CIPROFLOXACIN 2 MG/ML
400 INJECTION, SOLUTION INTRAVENOUS EVERY 12 HOURS
Status: DISCONTINUED | OUTPATIENT
Start: 2021-05-04 | End: 2021-05-05 | Stop reason: HOSPADM

## 2021-05-03 RX ORDER — ACETAMINOPHEN 650 MG/1
650 SUPPOSITORY RECTAL EVERY 6 HOURS PRN
Status: DISCONTINUED | OUTPATIENT
Start: 2021-05-03 | End: 2021-05-05 | Stop reason: HOSPADM

## 2021-05-03 RX ORDER — ATORVASTATIN CALCIUM 20 MG/1
20 TABLET, FILM COATED ORAL NIGHTLY
Status: DISCONTINUED | OUTPATIENT
Start: 2021-05-03 | End: 2021-05-05 | Stop reason: HOSPADM

## 2021-05-03 RX ORDER — ATORVASTATIN CALCIUM 20 MG/1
20 TABLET, FILM COATED ORAL NIGHTLY
COMMUNITY

## 2021-05-03 RX ORDER — METOPROLOL SUCCINATE 25 MG/1
25 TABLET, EXTENDED RELEASE ORAL DAILY
Status: ON HOLD | COMMUNITY
Start: 2021-02-08 | End: 2021-05-04 | Stop reason: HOSPADM

## 2021-05-03 RX ORDER — CIPROFLOXACIN 2 MG/ML
400 INJECTION, SOLUTION INTRAVENOUS ONCE
Status: COMPLETED | OUTPATIENT
Start: 2021-05-03 | End: 2021-05-03

## 2021-05-03 RX ORDER — PROMETHAZINE HYDROCHLORIDE 25 MG/1
12.5 TABLET ORAL EVERY 6 HOURS PRN
Status: DISCONTINUED | OUTPATIENT
Start: 2021-05-03 | End: 2021-05-05 | Stop reason: HOSPADM

## 2021-05-03 RX ADMIN — SODIUM CHLORIDE, POTASSIUM CHLORIDE, SODIUM LACTATE AND CALCIUM CHLORIDE: 600; 310; 30; 20 INJECTION, SOLUTION INTRAVENOUS at 18:23

## 2021-05-03 RX ADMIN — SODIUM CHLORIDE 25 ML: 9 INJECTION, SOLUTION INTRAVENOUS at 21:45

## 2021-05-03 RX ADMIN — CIPROFLOXACIN 400 MG: 2 INJECTION, SOLUTION INTRAVENOUS at 12:57

## 2021-05-03 RX ADMIN — METRONIDAZOLE 500 MG: 500 INJECTION, SOLUTION INTRAVENOUS at 21:47

## 2021-05-03 RX ADMIN — SODIUM CHLORIDE 1000 ML: 9 INJECTION, SOLUTION INTRAVENOUS at 11:08

## 2021-05-03 RX ADMIN — FENTANYL CITRATE 25 MCG: 50 INJECTION, SOLUTION INTRAMUSCULAR; INTRAVENOUS at 12:52

## 2021-05-03 RX ADMIN — METRONIDAZOLE 500 MG: 500 INJECTION, SOLUTION INTRAVENOUS at 14:09

## 2021-05-03 RX ADMIN — IOPAMIDOL 75 ML: 755 INJECTION, SOLUTION INTRAVENOUS at 11:45

## 2021-05-03 RX ADMIN — ESCITALOPRAM OXALATE 5 MG: 10 TABLET ORAL at 18:19

## 2021-05-03 ASSESSMENT — PAIN SCALES - GENERAL: PAINLEVEL_OUTOF10: 6

## 2021-05-03 ASSESSMENT — ENCOUNTER SYMPTOMS
ABDOMINAL DISTENTION: 0
STRIDOR: 0
COUGH: 0
VOMITING: 0
NAUSEA: 0
WHEEZING: 0
COLOR CHANGE: 0
BLOOD IN STOOL: 0
ABDOMINAL PAIN: 1
SHORTNESS OF BREATH: 0
CONSTIPATION: 0
BACK PAIN: 0
DIARRHEA: 1

## 2021-05-03 NOTE — H&P
HOSPITALISTS HISTORY AND PHYSICAL    5/3/2021 1:20 PM    Patient Information:  Chalo Kelly is a 65 y.o. female [id number]  PCP:  LYNDON Eubanks MD (Tel: [phone] )    Chief complaint:    Chief Complaint   Patient presents with    Loss of Consciousness     pt brought in by squad from home with c/o almost passing out. pt states she had some abdominal cramping before her shower, went ahead and jumped in but started to feel off while in the shower. after getting pt had 5 loose stools and almost passed out. per squad pt was bradycardic in the 40's, was given atropine 0.5 mg.  pt feeling better now. History of Present Illness:  Simone Dimas is a 65 y.o. female who woke up this morning and felt some abdominal cramping that went to take a shower in the shower patient started have more more abdominal cramping that got lightheaded and dizzy. Patient mainly went to the bathroom had 5 episodes of loose stools. Patient felt like she is about to pass out and called family. Patient did not have any chest pain with this no nausea with this no vomiting with this no diaphoresis with this did have bilateral arm tingling but no focal weakness or slurred speech no headache. EMS was called and patient's heart rate was in the 40s given a dose of atropine 0.5 mg and heart rate stabilized. [address] and had some [address] belly which did not taste good and threw it out right away. REVIEW OF SYSTEMS:   Constitutional: Negative for fever,chills or night sweats  ENT: Negative for rhinorrhea, epistaxis, hoarseness, sore throat.   Respiratory: Negative for shortness of breath,wheezing  Cardiovascular: Negative for chest pain, palpitations   Gastrointestinal: See above  Genitourinary: Negative for polyuria, dysuria   Hematologic/Lymphatic: Negative for bleeding tendency, easy bruising  Musculoskeletal: Negative for myalgias and arthralgias  Neurologic: See above  Skin: Negative for itching,rash  Psychiatric: Negative for depression,anxiety, agitation. Endocrine: Negative for polydipsia,polyuria,heat /cold intolerance. Past Medical History:   has a past medical history of Bulging lumbar disc, Degenerative disc disease, lumbar, and Thyroid disease. Past Surgical History:   has a past surgical history that includes Blepharoplasty (Left) and Dental surgery. Medications:  No current facility-administered medications on file prior to encounter. Current Outpatient Medications on File Prior to Encounter   Medication Sig Dispense Refill    escitalopram (LEXAPRO) 5 MG tablet Take 5 mg by mouth daily      metoprolol succinate (TOPROL XL) 25 MG extended release tablet Take 25 mg by mouth daily      atorvastatin (LIPITOR) 20 MG tablet Take 20 mg by mouth nightly      hydroCHLOROthiazide (HYDRODIURIL) 25 MG tablet Take 25 mg by mouth daily      aspirin 81 MG EC tablet Take 1 tablet by mouth daily 30 tablet 3    lisinopril (PRINIVIL;ZESTRIL) 10 MG tablet Take 20 mg by mouth daily       levothyroxine (SYNTHROID) 75 MCG tablet Take 75 mcg by mouth nightly       alendronate (FOSAMAX) 70 MG tablet Take 70 mg by mouth every 7 days      latanoprost (XALATAN) 0.005 % ophthalmic solution Place 1 drop into both eyes nightly          Allergies:  No Known Allergies     Social History:  Patient Lives athome   reports that she quit smoking about 38 years ago. Her smoking use included cigarettes. She started smoking about 54 years ago. She has a 15.00 pack-year smoking history. She has never used smokeless tobacco. She reports current alcohol use of about 6.0 standard drinks of alcohol per week. She reports that she does not use drugs.      Family History:  family history includes Breast Cancer in her mother; Cancer in her brother, mother, and sister; Colon Cancer in her father; Other in her sister. ,     Physical Exam:  /67   Pulse 62   Temp 96.9 °F (36.1 °C) (Infrared)   Resp 16   Ht 5' 4\" (1.626 m)   Wt 130 lb (59 kg)   SpO2 98%   BMI 22.31 kg/m²     General appearance:  Appears comfortable. AAOx3  HEENT: atraumatic, Pupils equal, muscous membranes moist, no masses appreciated  Cardiovascular: Regular rate and rhythm no murmurs appreciated  Respiratory: CTAB no wheezing  Gastrointestinal: Abdomen soft, bowel sounds positive mild suprapubic tenderness to palpation  EXT: no edema  Neurology: no gross focal deficts  Psychiatry: Appropriate affect. Not agitated  Skin: Warm, dry, no rashes appreciated    Labs:  CBC:   Lab Results   Component Value Date    WBC 7.1 05/03/2021    RBC 3.97 05/03/2021    HGB 13.7 05/03/2021    HCT 40.0 05/03/2021    .9 05/03/2021    MCH 34.4 05/03/2021    MCHC 34.1 05/03/2021    RDW 12.9 05/03/2021     05/03/2021    MPV 7.8 05/03/2021     BMP:    Lab Results   Component Value Date     05/03/2021    K 4.3 05/03/2021    K 3.8 03/30/2018     05/03/2021    CO2 27 05/03/2021    BUN 15 05/03/2021    CREATININE 0.8 05/03/2021    CALCIUM 8.7 05/03/2021    GFRAA >60 05/03/2021    LABGLOM >60 05/03/2021    GLUCOSE 100 05/03/2021     CT ABDOMEN PELVIS W IV CONTRAST Additional Contrast? None   Preliminary Result   1. Fluid-filled loops of distal small bowel in the central abdomen without   significant distention. Inflammatory stranding within the mesentery is also   seen. Bowel distal and proximal to this is decompressed. Transition point   appears to be located in the right lower quadrant suggestive of underlying   adhesion over focal enteritis. Recommend following to resolution. 2. Small hiatal hernia. 3. Moderate diverticulosis. XR CHEST PORTABLE   Preliminary Result   No acute cardiopulmonary process.              Recent imaging reviewed    Problem List  Active Problems:    Bradycardia  Resolved Problems: * No resolved hospital problems. *        Assessment/Plan:   Presyncope secondary to bradycardia  - check orthostatics  - hold bblocker, check tsh  - cardiology consult  - ivf  - pt ot    Enteritis vs sbo: npo surgery onboard  - cipro and flagyl      DVT prophylaxis lovenox  Code status full code        Admit as inpatient I anticipate hospitalization spanning more than two midnights for investigation and treatment of the above medically necessary diagnoses. Please note that some part of this chart was generated using Dragon dictation software. Although every effort was made to ensure the accuracy of this automated transcription, some errors in transcription may have occurred inadvertently. If you may need any clarification, please do not hesitate to contact me through [facility].        Venkata Pineda MD    5/3/2021 1:20 PM

## 2021-05-03 NOTE — ED PROVIDER NOTES
[address] [facility]        Pt Name: Paulina Gee  MRN: [id number]  [place] 1950  Date of evaluation: 5/3/2021  Provider: Selam Bauer PA-C  PCP: LYNDON Thompson MD    ISAIAH. I have evaluated this patient. My supervising physician was available for consultation. CHIEF COMPLAINT       Chief Complaint   Patient presents with    Loss of Consciousness     pt brought in by squad from home with c/o almost passing out. pt states she had some abdominal cramping before her shower, went ahead and jumped in but started to feel off while in the shower. after getting pt had 5 loose stools and almost passed out. per squad pt was bradycardic in the 40's, was given atropine 0.5 mg.  pt feeling better now. HISTORY OF PRESENT ILLNESS   (Location, Timing/Onset, Context/Setting, Quality, Duration, Modifying Factors, Severity, Associated Signs and Symptoms)  Note limiting factors. Paulina Gee is a 65 y.o. female with history of lumbar disc disease and thyroid disease who presents to the emergency department stating that this morning before getting into the shower, she started getting some light abdominal cramping. While she was in the shower the cramping became more severe and she felt very lightheaded. She then had 5 episodes of loose stool in the toilet. Denies bowel or bladder incontinence. Denies bloody or black stool. When paramedics arrived, heart rate was in the 40s. They gave her a dose of atropine and her heart rate has been stabilized since then. She went to an open house at the [facility] soccer stadium and ate pork belly sausage that did not taste good. Her  agrees that he took a bite and spit it out because it tasted badly. They think this may be food poisoning. Nursing Notes were all reviewed and agreed with or any disagreements were addressed in the HPI.     REVIEW OF SYSTEMS    (2-9 systems for level 4, 10 or more for level 5)     Review of Systems   Constitutional: Positive for fatigue. Negative for chills and fever. HENT: Negative. Eyes: Negative for visual disturbance. Respiratory: Negative for cough, shortness of breath, wheezing and stridor. Cardiovascular: Negative for chest pain, palpitations and leg swelling. Gastrointestinal: Positive for abdominal pain and diarrhea. Negative for abdominal distention, blood in stool, constipation, nausea and vomiting. Endocrine: Negative. Genitourinary: Negative. Musculoskeletal: Negative for back pain, neck pain and neck stiffness. Skin: Negative for color change, pallor, rash and wound. Neurological: Positive for light-headedness. Negative for dizziness, tremors, seizures, syncope, facial asymmetry, speech difficulty, weakness, numbness and headaches. Psychiatric/Behavioral: Negative for confusion. All other systems reviewed and are negative. Positives and Pertinent negatives as per HPI. Except as noted above in the ROS, all other systems were reviewed and negative.        PAST MEDICAL HISTORY     Past Medical History:   Diagnosis Date    Bulging lumbar disc     August 2020    Degenerative disc disease, lumbar 08/2020    Thyroid disease          SURGICAL HISTORY     Past Surgical History:   Procedure Laterality Date    BLEPHAROPLASTY Left     DENTAL SURGERY      implant         CURRENTMEDICATIONS       Previous Medications    ALENDRONATE (FOSAMAX) 70 MG TABLET    Take 70 mg by mouth every 7 days    ASPIRIN 81 MG EC TABLET    Take 1 tablet by mouth daily    ATORVASTATIN (LIPITOR) 20 MG TABLET    Take 20 mg by mouth nightly    ESCITALOPRAM (LEXAPRO) 5 MG TABLET    Take 5 mg by mouth daily    HYDROCHLOROTHIAZIDE (HYDRODIURIL) 25 MG TABLET    Take 25 mg by mouth daily    LATANOPROST (XALATAN) 0.005 % OPHTHALMIC SOLUTION    Place 1 drop into both eyes nightly     LEVOTHYROXINE (SYNTHROID) 75 MCG TABLET    Take 75 mcg by mouth nightly to light. Neck:      Musculoskeletal: Normal range of motion. Cardiovascular:      Rate and Rhythm: Normal rate. Pulmonary:      Effort: Pulmonary effort is normal.      Breath sounds: Normal breath sounds. Abdominal:      General: Bowel sounds are normal. There is no abdominal bruit. Palpations: Abdomen is soft. There is no pulsatile mass. Tenderness: There is abdominal tenderness in the suprapubic area. There is no right CVA tenderness, left CVA tenderness, guarding or rebound. Negative signs include Chu's sign, Rovsing's sign, McBurney's sign, psoas sign and obturator sign. Musculoskeletal: Normal range of motion. Skin:     General: Skin is warm and dry. Capillary Refill: Capillary refill takes less than 2 seconds. Coloration: Skin is not jaundiced or pale. Findings: No bruising, erythema, lesion or rash. Neurological:      General: No focal deficit present. Mental Status: She is alert and oriented to person, place, and time. Comments: No pronator drift, facial droop or slurred speech. Normal finger to nose coordination. Normal rapid alternating hand movement. Normal heel to shin coordination   [place] negative without nystagmus. 5 out of 5 strength in all 4 extremities without focal weakness, paresthesia or radiculopathy.    Psychiatric:         Mood and Affect: Mood normal.         Behavior: Behavior normal.         DIAGNOSTIC RESULTS   LABS:    Labs Reviewed   CBC WITH AUTO DIFFERENTIAL - Abnormal; Notable for the following components:       Result Value    RBC 3.97 (*)     .9 (*)     MCH 34.4 (*)     Lymphocytes Absolute 0.7 (*)     All other components within normal limits    Narrative:     Performed at:  [facility] [address], [facility], [address]   Phone [phone]   COMPREHENSIVE METABOLIC PANEL - Abnormal; Notable for the following components:    Glucose 100 (*)     All other components within normal limits    Narrative:     Performed at:  [facility] [address],  [place], [address]   Phone [phone]   BRAIN NATRIURETIC PEPTIDE - Abnormal; Notable for the following components:    Pro- (*)     All other components within normal limits    Narrative:     Performed at:  [facility] [address],  [place], [address]   Phone [phone]   LIPASE    Narrative:     Performed at:  [facility] [address],  [place], [address]   Phone [phone]   TROPONIN    Narrative:     Performed at:  [facility] [address], [place],  [place], [address]   Phone [phone]   MAGNESIUM    Narrative:     Performed at:  [facility] [address],  [place], [address]   Phone [phone]   URINE RT REFLEX TO CULTURE       All other labs were within normal range or not returned as of this dictation. EKG: All EKG's are interpreted by the Emergency Department Physician in the absence of a cardiologist.  Please see their note for interpretation of EKG. RADIOLOGY:   Non-plain film images such as CT, Ultrasound and MRI are read by the radiologist. Plain radiographic images are visualized and preliminarily interpreted by the ED Provider with the below findings:        Interpretation per the Radiologist below, if available at the time of this note:    CT ABDOMEN PELVIS W IV CONTRAST Additional Contrast? None   Preliminary Result   1. Fluid-filled loops of distal small bowel in the central abdomen without   significant distention. Inflammatory stranding within the mesentery is also   seen. Bowel distal and proximal to this is decompressed. Transition point   appears to be located in the right lower quadrant suggestive of underlying   adhesion over focal enteritis. Recommend following to resolution.    2. Small hiatal hernia. 3. Moderate diverticulosis. XR CHEST PORTABLE   Preliminary Result   No acute cardiopulmonary process. PROCEDURES   Unless otherwise noted below, none     Procedures    CRITICAL CARE TIME   N/A    CONSULTS:  IP CONSULT TO GENERAL SURGERY  IP CONSULT TO HOSPITALIST  I discussed the case with Dr Hannah Mederos, hospitalist, and reviewed the CT results. He says this could represent intussusception, bowel obstruction, enteritis. Does not advise for NG tube at this time. Recommend ciprofloxacin and Flagyl and hospitalist admission. EMERGENCY DEPARTMENT COURSE and DIFFERENTIAL DIAGNOSIS/MDM:   Vitals:    Vitals:    05/03/21 1030 05/03/21 1100 05/03/21 1200 05/03/21 1230   BP: 108/81 128/74 134/77 129/67   Pulse: 65 59 60 62   Resp:       Temp:       TempSrc:       SpO2: 97% 96% 98% 98%   Weight:       Height:           Patient was given the following medications:  Medications   ciprofloxacin (CIPRO) IVPB 400 mg (400 mg Intravenous New Bag 5/3/21 1257)   metronidazole (FLAGYL) 500 mg in NaCl 100 mL IVPB premix (has no administration in time range)   0.9 % sodium chloride bolus (0 mLs Intravenous Stopped 5/3/21 1239)   iopamidol (ISOVUE-370) 76 % injection 75 mL (75 mLs Intravenous Given 5/3/21 1145)   fentaNYL (SUBLIMAZE) injection 25 mcg (25 mcg Intravenous Given 5/3/21 1252)         This patient presents to the emergency department after having a near syncopal episode in her shower. She has had several loose bowel movements today. She does report low abdominal cramping with this. She was also briefly bradycardic. EKG morphology appears stable at this time. Other blood work appears stable. CT scan shows enteritis versus intussusception versus bowel obstruction and inflammatory stranding. I discussed the case with general surgeon and he advises for ciprofloxacin and Flagyl and admission and repeat imaging. Hospitalist has agreed to admit. We have addressed concerns and expectations.

## 2021-05-03 NOTE — ACP (ADVANCE CARE PLANNING)
Advanced Care Planning Note.     Purpose of Encounter: Advanced care planning in light of hospitalization  Parties In Attendance: Patient,    Decisional Capacity: Yes  Subjective: Patient  understand that this conversation is to address long term care goal  Objective: Patient here with presyncopal event with enteritis question small bowel obstruction and bradycardia  Goals of Care Determination: Patient would pursue CPR intubation PEG tube and dialysis no tracheostomy or long-term ventilation no aggressive measures if significant brain damage  Code Status: full code  Time spent on Advanced care Plannin minutes  Advanced Care Planning Documents: documented patient's wishes, would like  Maxwell Whitley to make medical decisions if unable to make decisions    Jessica Whitten MD  5/3/2021 1:23 PM

## 2021-05-03 NOTE — ED NOTES
Orthostatic vital signs completed per orders. Pt denies any dizziness when changing positions. IVF started per orders.      Angelica Nettles, RN  05/03/21 6786

## 2021-05-03 NOTE — CONSULTS
Yes Historical Provider, MD   latanoprost (XALATAN) 0.005 % ophthalmic solution Place 1 drop into both eyes nightly    Yes Historical Provider, MD        Allergies:  Patient has no known allergies. Social History:    reports that she quit smoking about 38 years ago. Her smoking use included cigarettes. She started smoking about 54 years ago. She has a 15.00 pack-year smoking history. She has never used smokeless tobacco. She reports current alcohol use of about 6.0 standard drinks of alcohol per week. She reports that she does not use drugs.     Family History:        Problem Relation Age of Onset    Breast Cancer Mother     Cancer Mother     Colon Cancer Father     Cancer Sister     Other Sister     Cancer Brother        REVIEW OF SYSTEMS:  CONSTITUTIONAL:  positive for  fatigue  HEENT:  negative  RESPIRATORY:  negative  CARDIOVASCULAR:  negative  GASTROINTESTINAL:  negative except for nausea, change in bowel habits and abdominal pain  GENITOURINARY:  negative  HEMATOLOGIC/LYMPHATIC:  negative  NEUROLOGICAL:  negative  SKIN: negative    PHYSICAL EXAM:  VITALS:  /67   Pulse 62   Temp 96.9 °F (36.1 °C) (Infrared)   Resp 16   Ht 5' 4\" (1.626 m)   Wt 130 lb (59 kg)   SpO2 98%   BMI 22.31 kg/m²   24HR INTAKE/OUTPUT:      Intake/Output Summary (Last 24 hours) at 5/3/2021 1401  Last data filed at 5/3/2021 1239  Gross per 24 hour   Intake 1000 ml   Output    Net 1000 ml     DRAIN/TUBE OUTPUT:     CONSTITUTIONAL:  alert, no apparent distress and normal weight  EYES:  sclera clear  ENT:  normocepalic, without obvious abnormality  NECK:  supple, symmetrical, trachea midline  LUNGS:  clear to auscultation  CARDIOVASCULAR:  regular rate and rhythm and no murmur noted  ABDOMEN:  no scars, normal bowel sounds, soft, non-distended, tenderness noted in the lower mid abdomen, voluntary guarding absent, no masses palpated, no hepatosplenomegally and hernia absent  MUSCULOSKELETAL:  0+ pitting edema lower hernia. The stomach is otherwise unremarkable. Loops of small bowel in the central abdomen are fluid-filled without significant distention. Mild wall thickening is identified with adjacent inflammatory stranding. The colon is normal in course and caliber. Moderate diverticulosis. Pelvis: Normal bladder. Uterus is unremarkable. No suspicious adnexal masses. Peritoneum/Retroperitoneum: No free fluid or free air. No pathologic lymphadenopathy. Aorta and its branches are patent and normal in course and caliber. Mild atherosclerosis. Bones/Soft Tissues: No acute or aggressive osseous lesion. 1. Fluid-filled loops of distal small bowel in the central abdomen without significant distention. Inflammatory stranding within the mesentery is also seen. Bowel distal and proximal to this is decompressed. Transition point appears to be located in the right lower quadrant suggestive of underlying adhesion over focal enteritis. Recommend following to resolution. 2. Small hiatal hernia. 3. Moderate diverticulosis. Xr Chest Portable    Result Date: 5/3/2021  EXAMINATION: ONE XRAY VIEW OF THE CHEST 5/3/2021 10:33 am COMPARISON: October 5, 2020 HISTORY: ORDERING SYSTEM PROVIDED HISTORY: near syncope TECHNOLOGIST PROVIDED HISTORY: Reason for exam:->near syncope Reason for Exam: Loss of Consciousness (pt brought in by squad from home with c/o almost passing out. pt states she had some abdominal cramping before her shower, went ahead and jumped in but started to feel off while in the shower. after getting pt had 5 loose stools and almost passed out. per squad pt was bradycardic in the 40's, was given atropine 0.5 mg. pt feeling better now.) Acuity: Acute Type of Exam: Initial FINDINGS: Cardiomediastinal silhouette is normal in size. The lungs are clear. No pleural effusion or pneumothorax is present. No acute cardiopulmonary process.        IMPRESSION/RECOMMENDATIONS:    Partial SBO, vs enteritis  Partial obstruction more likely. Improved since initial episode this am, but still has some pain  No prior abd surgery, but may have partially obstructing omental band or something similar anyway   Admit, IVF, atbx, NPO except ice and meds, and follow up exam  Repeat CBC and AXR in am  OR if pain and abnormal BG pattern persist. Abdvance diet if not.   TARAN pt, all Q answered    Thank you, will follow    Melany Gotti

## 2021-05-03 NOTE — ED NOTES
Pt back from CT. Does not feel like she needs to urinate at this time.      Joe Roe, RN  05/03/21 3454

## 2021-05-03 NOTE — ED NOTES
Pt's  at bedside. [place] States they had pork belly sausage and it tasted horrible. States pt took two bites.      Norris Li RN  05/03/21 5114

## 2021-05-03 NOTE — FLOWSHEET NOTE
Patient admitted with bradycardia and dizziness. H/O mini stroke 2020. A/O x4. Ambulatory. Continent of b/b. Had 5 loose stools at home then became dizzy. Squad brought to ED. Cardiology consulted. NPO w/exceptions. Troponin normal. Contact precautions in place d/t loose stools. LR fluids infusing @ 100ml/hr. Patient not sure that she has advanced directives in place.

## 2021-05-03 NOTE — CONSULTS
[place] 79   Electrophysiology Consultation   Date: 5/3/2021  Reason for Consultation: Bradycardia  Consult Requesting Physician: Madi Maharaj MD     Chief Complaint   Patient presents with    Loss of Consciousness     pt brought in by squad from home with c/o almost passing out. pt states she had some abdominal cramping before her shower, went ahead and jumped in but started to feel off while in the shower. after getting pt had 5 loose stools and almost passed out. per squad pt was bradycardic in the 40's, was given atropine 0.5 mg.  pt feeling better now. HPI: Leon Redman is a 65 y.o. female with history of hypertension hyperlipidemia, hypothyroidism was admitted with syncope. Patient woke up with some abdominal cramping and went to the get in the shower this morning and began having abdominal cramping and worsened while in the shower with lightheadedness. Then she had 5 loose stools. When she was seen by paramedics her heart rate was in 45s and she was given atropine with improvement in her heart rate. Past Medical History:   Diagnosis Date    Bulging lumbar disc     August 2020    Degenerative disc disease, lumbar 08/2020    Thyroid disease         Past Surgical History:   Procedure Laterality Date    BLEPHAROPLASTY Left     DENTAL SURGERY      implant       No Known Allergies    Social History:  Reviewed. reports that she quit smoking about 38 years ago. Her smoking use included cigarettes. She started smoking about 54 years ago. She has a 15.00 pack-year smoking history. She has never used smokeless tobacco. She reports current alcohol use of about 6.0 standard drinks of alcohol per week. She reports that she does not use drugs. Family History:  Reviewed. family history includes Breast Cancer in her mother; Cancer in her brother, mother, and sister; Colon Cancer in her father; Other in her sister.      Review of System:  All other systems reviewed and are negative appears intact, No Gross deficit   · Skin: Skin is warm and dry. No rash noted. · Psychiatric: Has a normal behavior     Labs, diagnostic and imaging results reviewed. Reviewed. Recent Labs     05/03/21  1044      K 4.3      CO2 27   BUN 15   CREATININE 0.8     Recent Labs     05/03/21  1044   WBC 7.1   HGB 13.7   HCT 40.0   .9*        Lab Results   Component Value Date    TROPONINI <0.01 05/03/2021     No results found for: BNP  Lab Results   Component Value Date    PROTIME 11.8 10/05/2020    INR 1.02 10/05/2020     Lab Results   Component Value Date    CHOL 192 10/06/2020    HDL 81 10/06/2020    TRIG 51 10/06/2020       ECG: Sinus rhythm  Echo: 10/5/2020  Conclusions      Summary   -Overall left ventricular function is normal.   -Ejection fraction is visually estimated to be 55-60 %.   -The left ventricle was not well visualized. -Mildly increased left ventricular wall thickness.   - Grade I diastolic dysfunction with normal LV filling pressures. -Mild mitral regurgitation is present. -Mild tricuspid regurgitation.   -A bubble study was performed and fails to show evidence of shunting. -IVC size is normal (<2.1cm) and collapses > 50% with respiration consistent   with normal RA pressure (3mmHg).   Cath:     Scheduled Meds:   aspirin  81 mg Oral Daily    atorvastatin  20 mg Oral Nightly    escitalopram  5 mg Oral Daily    [START ON 5/4/2021] levothyroxine  75 mcg Oral QAM AC    sodium chloride flush  5-40 mL Intravenous 2 times per day    [START ON 5/4/2021] enoxaparin  40 mg Subcutaneous Daily    [START ON 5/4/2021] ciprofloxacin  400 mg Intravenous Q12H    metroNIDAZOLE  500 mg Intravenous Q8H     Continuous Infusions:   sodium chloride      lactated ringers       PRN Meds:.sodium chloride flush, sodium chloride, promethazine **OR** ondansetron, polyethylene glycol, acetaminophen **OR** acetaminophen     Patient Active Problem List    Diagnosis Date Noted    Bradycardia 05/03/2021    TIA (transient ischemic attack) 10/05/2020    Syncope and collapse 03/29/2018    Hyponatremia 03/29/2018    Hypothyroidism 03/29/2018    Glaucoma 03/29/2018      Active Hospital Problems    Diagnosis Date Noted    Bradycardia [R00.1] 05/03/2021       Assessment:       Plan:    -Syncope     This is very likely related to a vagal episode due to abdominal discomfort. No further action is necessary with site treating her primary condition. She had an episode of syncope in 2018 due to dehydration and hypotension. Hydration is recommended. -Bradycardia     Based on her history this is likely due to a vagal episode. No further action is necessary since patient has no history of prior bradycardia or any other risk factors. -Hypertension     BP needs better control. Lisinopril can be resumed if there is no other issue present.      -Hypothyroidism     Continue replacement and management as per primary team.         Thank you for allowing me to participate in the care of Adriana Whitley     NOTE: This report was transcribed using voice recognition software. Every effort was made to ensure accuracy, however, inadvertent computerized transcription errors may be present.

## 2021-05-03 NOTE — ED NOTES
Nabor Quinonez RN, @ bedside. Report given. Pt assisted into wheelchair for transfer to floor.      Marylni Mosley RN  05/03/21 7504

## 2021-05-03 NOTE — ED NOTES
Pharmacy Medication History Note      List of current medications patient is taking is complete. Source of information: [place] made to medication list:  Medications flagged for removal (include reason, ex. noncompliance):  none    Medications removed (include reason, ex. therapy complete or physician discontinued):  See list below    Medications added/doses adjusted: HCTZ 25 mg daily    Other notes (ex. Recent course of antibiotics, Coumadin dosing):  Denies use of other OTC or herbal medications. Last dose times updated. Luba Ospina, PharmD  ED Pharmacist [id number]  5/3/2021    No current facility-administered medications on file prior to encounter. Current Outpatient Medications on File Prior to Encounter   Medication Sig Dispense Refill    escitalopram (LEXAPRO) 5 MG tablet Take 5 mg by mouth daily      metoprolol succinate (TOPROL XL) 25 MG extended release tablet Take 25 mg by mouth daily      atorvastatin (LIPITOR) 20 MG tablet Take 20 mg by mouth nightly      hydroCHLOROthiazide (HYDRODIURIL) 25 MG tablet Take 25 mg by mouth daily      aspirin 81 MG EC tablet Take 1 tablet by mouth daily 30 tablet 3    lisinopril (PRINIVIL;ZESTRIL) 10 MG tablet Take 20 mg by mouth daily       levothyroxine (SYNTHROID) 75 MCG tablet Take 75 mcg by mouth nightly       alendronate (FOSAMAX) 70 MG tablet Take 70 mg by mouth every 7 days      latanoprost (XALATAN) 0.005 % ophthalmic solution Place 1 drop into both eyes nightly       [DISCONTINUED] atorvastatin (LIPITOR) 10 MG tablet Take 1 tablet by mouth daily 30 tablet 1    [DISCONTINUED] LORazepam (ATIVAN) 1 MG tablet Take 1 mg by mouth every 8 hours as needed for Anxiety.       [DISCONTINUED] Multiple Vitamin (MULTIVITAMIN) tablet Take 1 tablet by mouth daily       [DISCONTINUED] Calcium 500-125 MG-UNIT TABS Take 1 tablet by mouth daily

## 2021-05-03 NOTE — PROGRESS NOTES
[facility]   Electrophysiology Nurse Practitioner  Pre-Consult Rounding    Date: 5/3/2021  Date of admission: 5/3/2021  9:10 AM  Reason for Admission: Bradycardia [R00.1]  Consult Requesting Physician: Marie Bill MD     Ruchi Dela Cruz is a 65 y.o. female presented with hospital with syncope. Pt went to get in the shower this morning and began having abdominal cramping. While in the shower, the cramping worsened with lightheadedness. She then had 5 loose stools. Per EMS, HR was in the 40s and she was given a dose of atropine with improvement in her HR. EP consulted for bradycardia. Past medical history: HTN, HLD, Hypothyroidism, DDD    EC/3/21  Sinus rhythm, rate 85    ECHO:  10/20   -Overall left ventricular function is normal.   -Ejection fraction is visually estimated to be 55-60 %.   -The left ventricle was not well visualized. -Mildly increased left ventricular wall thickness.   - Grade I diastolic dysfunction with normal LV filling pressures. -Mild mitral regurgitation is present. -Mild tricuspid regurgitation.   -A bubble study was performed and fails to show evidence of shunting. -IVC size is normal (<2.1cm) and collapses > 50% with respiration consistent   with normal RA pressure (3mmHg). CT Abd: 5/3/21  1. Fluid-filled loops of distal small bowel in the central abdomen without   significant distention.  Inflammatory stranding within the mesentery is also   seen.  Bowel distal and proximal to this is decompressed.  Transition point   appears to be located in the right lower quadrant suggestive of underlying   adhesion over focal enteritis.  Recommend following to resolution. 2. Small hiatal hernia. 3. Moderate diverticulosis. Lab Results   Component Value Date    LVEF 58 10/06/2020    LVEFMODE Echo 2018     No results found for: TSHFT4, TSH    Plan:     1. Bradycardia   - Currently sinus in 70s   - Hold BB for now   - No urgent EP intervention    2. Syncope   - Etiology likely vasovagal given GI issues   - On IVF    3. HTN  - Controlled: Goal <130/80  - Continue current medications    4. Enteritis   - On ABX   - Surgery following    Will discuss the plan for EP attending. Full consult note to follow.

## 2021-05-04 ENCOUNTER — APPOINTMENT (OUTPATIENT)
Dept: GENERAL RADIOLOGY | Age: 71
DRG: 392 | End: 2021-05-04
Payer: MEDICARE

## 2021-05-04 LAB
ANION GAP SERPL CALCULATED.3IONS-SCNC: 9 MMOL/L (ref 3–16)
BASOPHILS ABSOLUTE: 0 K/UL (ref 0–0.2)
BASOPHILS RELATIVE PERCENT: 1 %
BUN BLDV-MCNC: 9 MG/DL (ref 7–20)
CALCIUM SERPL-MCNC: 8.4 MG/DL (ref 8.3–10.6)
CHLORIDE BLD-SCNC: 107 MMOL/L (ref 99–110)
CO2: 23 MMOL/L (ref 21–32)
CREAT SERPL-MCNC: 0.7 MG/DL (ref 0.6–1.2)
EOSINOPHILS ABSOLUTE: 0.1 K/UL (ref 0–0.6)
EOSINOPHILS RELATIVE PERCENT: 2.4 %
GFR AFRICAN AMERICAN: >60
GFR NON-AFRICAN AMERICAN: >60
GLUCOSE BLD-MCNC: 86 MG/DL (ref 70–99)
HCT VFR BLD CALC: 36.3 % (ref 36–48)
HEMOGLOBIN: 12.4 G/DL (ref 12–16)
LYMPHOCYTES ABSOLUTE: 0.9 K/UL (ref 1–5.1)
LYMPHOCYTES RELATIVE PERCENT: 22.4 %
MCH RBC QN AUTO: 34.5 PG (ref 26–34)
MCHC RBC AUTO-ENTMCNC: 34.2 G/DL (ref 31–36)
MCV RBC AUTO: 100.7 FL (ref 80–100)
MONOCYTES ABSOLUTE: 0.3 K/UL (ref 0–1.3)
MONOCYTES RELATIVE PERCENT: 7.8 %
NEUTROPHILS ABSOLUTE: 2.8 K/UL (ref 1.7–7.7)
NEUTROPHILS RELATIVE PERCENT: 66.4 %
PDW BLD-RTO: 12.3 % (ref 12.4–15.4)
PLATELET # BLD: 177 K/UL (ref 135–450)
PMV BLD AUTO: 8.7 FL (ref 5–10.5)
POTASSIUM REFLEX MAGNESIUM: 3.6 MMOL/L (ref 3.5–5.1)
RBC # BLD: 3.61 M/UL (ref 4–5.2)
SODIUM BLD-SCNC: 139 MMOL/L (ref 136–145)
TSH REFLEX: 1.23 UIU/ML (ref 0.27–4.2)
WBC # BLD: 4.2 K/UL (ref 4–11)

## 2021-05-04 PROCEDURE — 97530 THERAPEUTIC ACTIVITIES: CPT

## 2021-05-04 PROCEDURE — 6360000002 HC RX W HCPCS: Performed by: INTERNAL MEDICINE

## 2021-05-04 PROCEDURE — 85025 COMPLETE CBC W/AUTO DIFF WBC: CPT

## 2021-05-04 PROCEDURE — 97165 OT EVAL LOW COMPLEX 30 MIN: CPT

## 2021-05-04 PROCEDURE — 74019 RADEX ABDOMEN 2 VIEWS: CPT

## 2021-05-04 PROCEDURE — 2580000003 HC RX 258: Performed by: SURGERY

## 2021-05-04 PROCEDURE — 2500000003 HC RX 250 WO HCPCS: Performed by: INTERNAL MEDICINE

## 2021-05-04 PROCEDURE — 97116 GAIT TRAINING THERAPY: CPT

## 2021-05-04 PROCEDURE — 97161 PT EVAL LOW COMPLEX 20 MIN: CPT

## 2021-05-04 PROCEDURE — 99232 SBSQ HOSP IP/OBS MODERATE 35: CPT | Performed by: NURSE PRACTITIONER

## 2021-05-04 PROCEDURE — 99232 SBSQ HOSP IP/OBS MODERATE 35: CPT | Performed by: SURGERY

## 2021-05-04 PROCEDURE — 2060000000 HC ICU INTERMEDIATE R&B

## 2021-05-04 PROCEDURE — 94760 N-INVAS EAR/PLS OXIMETRY 1: CPT

## 2021-05-04 PROCEDURE — 2580000003 HC RX 258: Performed by: INTERNAL MEDICINE

## 2021-05-04 PROCEDURE — 6370000000 HC RX 637 (ALT 250 FOR IP): Performed by: INTERNAL MEDICINE

## 2021-05-04 PROCEDURE — 87505 NFCT AGENT DETECTION GI: CPT

## 2021-05-04 PROCEDURE — 80048 BASIC METABOLIC PNL TOTAL CA: CPT

## 2021-05-04 PROCEDURE — 36415 COLL VENOUS BLD VENIPUNCTURE: CPT

## 2021-05-04 RX ORDER — METRONIDAZOLE 500 MG/1
500 TABLET ORAL 3 TIMES DAILY
Qty: 30 TABLET | Refills: 0 | Status: SHIPPED | OUTPATIENT
Start: 2021-05-04 | End: 2021-05-14

## 2021-05-04 RX ORDER — CIPROFLOXACIN 500 MG/1
500 TABLET, FILM COATED ORAL 2 TIMES DAILY
Qty: 20 TABLET | Refills: 0 | Status: SHIPPED | OUTPATIENT
Start: 2021-05-04 | End: 2021-05-14

## 2021-05-04 RX ADMIN — ASPIRIN 81 MG: 81 TABLET, COATED ORAL at 09:48

## 2021-05-04 RX ADMIN — ESCITALOPRAM OXALATE 5 MG: 10 TABLET ORAL at 09:48

## 2021-05-04 RX ADMIN — ATORVASTATIN CALCIUM 20 MG: 20 TABLET, FILM COATED ORAL at 20:31

## 2021-05-04 RX ADMIN — ENOXAPARIN SODIUM 40 MG: 40 INJECTION SUBCUTANEOUS at 11:02

## 2021-05-04 RX ADMIN — SODIUM CHLORIDE, POTASSIUM CHLORIDE, SODIUM LACTATE AND CALCIUM CHLORIDE: 600; 310; 30; 20 INJECTION, SOLUTION INTRAVENOUS at 05:03

## 2021-05-04 RX ADMIN — METRONIDAZOLE 500 MG: 500 INJECTION, SOLUTION INTRAVENOUS at 13:57

## 2021-05-04 RX ADMIN — CIPROFLOXACIN 400 MG: 2 INJECTION, SOLUTION INTRAVENOUS at 13:56

## 2021-05-04 RX ADMIN — ACETAMINOPHEN 650 MG: 325 TABLET ORAL at 03:37

## 2021-05-04 RX ADMIN — Medication 10 ML: at 20:39

## 2021-05-04 RX ADMIN — CIPROFLOXACIN 400 MG: 2 INJECTION, SOLUTION INTRAVENOUS at 02:13

## 2021-05-04 RX ADMIN — LEVOTHYROXINE SODIUM 75 MCG: 75 TABLET ORAL at 09:48

## 2021-05-04 RX ADMIN — SODIUM CHLORIDE, POTASSIUM CHLORIDE, SODIUM LACTATE AND CALCIUM CHLORIDE: 600; 310; 30; 20 INJECTION, SOLUTION INTRAVENOUS at 20:38

## 2021-05-04 RX ADMIN — METRONIDAZOLE 500 MG: 500 INJECTION, SOLUTION INTRAVENOUS at 06:13

## 2021-05-04 RX ADMIN — METRONIDAZOLE 500 MG: 500 INJECTION, SOLUTION INTRAVENOUS at 20:37

## 2021-05-04 ASSESSMENT — PAIN SCALES - GENERAL: PAINLEVEL_OUTOF10: 0

## 2021-05-04 NOTE — PROGRESS NOTES
for IV pole management. Balance  Posture: Good  Sitting - Static: Good  Sitting - Dynamic: Good  Standing - Static: Good(No AD)  Standing - Dynamic: (No AD)  Comments: Pt mod I for all balance. Plan   Plan  Times per week: 0  Current Treatment Recommendations: Strengthening, Endurance Training, Functional Mobility Training, Gait Training, Transfer Training  Safety Devices  Type of devices: All fall risk precautions in place, Call light within reach, Gait belt, Left in chair  Restraints  Initially in place: No    G-Code  OutComes Score  AM-PAC Score  AM-PAC Inpatient Mobility Raw Score : 24 (05/04/21 1200)  AM-PAC Inpatient T-Scale Score : 61.14 (05/04/21 1200)  Mobility Inpatient CMS 0-100% Score: 0 (05/04/21 1200)  Mobility Inpatient CMS G-Code Modifier : CH (05/04/21 1200)    Goals  Short term goals  Time Frame for Short term goals: No acute IP PT goals noted. Patient Goals   Patient goals : To return home. Therapy Time   Individual Concurrent Group Co-treatment   Time In 2130         Time Out 1015         Minutes 23         Timed Code Treatment Minutes: 8 Minutes     Roger Dodd, SPT  PT providing direct supervision during session and assisting in making skilled judgements throughout session.   Jaylyn Caal, PT   Jaylyn Caal, PT, DPT, [phone]

## 2021-05-04 NOTE — PROGRESS NOTES
Occupational Therapy   Occupational Therapy Initial Assessment/Discharge Summary  Date: 2021   Patient Name: Leon Redman  MRN: [id number]     : 1950    Date of Service: 2021    Discharge Recommendations: Leon Redman scored a 24/24 on the [facility] ADL Inpatient form. At this time, no further OT is recommended upon discharge due to home services. Recommend patient returns to prior setting with prior services. OT Equipment Recommendations  Equipment Needed: No    Assessment   Assessment: Patient presents near baseline level of function, no OT indicated at this time  Decision Making: Low Complexity  Exam: as above  OT Education: OT Role  Patient Education: eval, discharge - patient v/u  REQUIRES OT FOLLOW UP: No  Activity Tolerance  Activity Tolerance: Patient Tolerated treatment well  Safety Devices  Safety Devices in place: Yes  Type of devices: All fall risk precautions in place; Left in chair;Chair alarm in place;Call light within reach;Gait belt           Patient Diagnosis(es): The primary encounter diagnosis was Near syncope. Diagnoses of Sinus bradycardia and Abnormal abdominal CT scan were also pertinent to this visit. has a past medical history of Bulging lumbar disc, Degenerative disc disease, lumbar, and Thyroid disease. has a past surgical history that includes Blepharoplasty (Left) and Dental surgery. Restrictions  Restrictions/Precautions  Restrictions/Precautions: Fall Risk(low fall risk)  Required Braces or Orthoses?: No  Position Activity Restriction  Other position/activity restrictions: 65 y.o. female with history of lumbar disc disease and thyroid disease who presents to the emergency department stating that this morning before getting into the shower, she started getting some light abdominal cramping. While she was in the shower the cramping became more severe and she felt very lightheaded. She then had 5 episodes of loose stool in the toilet.   Denies bowel or bladder incontinence. Denies bloody or black stool. When paramedics arrived, heart rate was in the 40s. They gave her a dose of atropine and her heart rate has been stabilized since then. She went to an open house at the [facility] and ate pork belly sausage that did not taste good. Her  agrees that he took a bite and spit it out because it tasted badly. They think this may be food poisoning.     Subjective   General  Chart Reviewed: Yes  Patient assessed for rehabilitation services?: Yes  Diagnosis: Bradycardia  Subjective  Subjective: Patient supine in bed upon arrival, agreeable to evaluation, denies pain  Patient Currently in Pain: Denies  Pain Assessment  Pain Assessment: 0-10  Pain Level: 0  Pre Treatment Pain Screening  Intervention List: Patient able to continue with treatment  Vital Signs  Temp: 97.8 °F (36.6 °C)  Temp Source: Oral  Pulse: 64  Heart Rate Source: Monitor  Resp: 16  BP: 126/79  BP Location: Right upper arm  Patient Position: Up in chair  Level of Consciousness: Alert (0)  MEWS Score: 1  Patient Currently in Pain: Denies  Oxygen Therapy  SpO2: 94 %  Pulse Oximeter Device Mode: Intermittent  Pulse Oximeter Device Location: Left;Finger  O2 Device: None (Room air)     Social/Functional History  Social/Functional History  Lives With: Spouse  Type of Home: House  Home Layout: Two level, 1/2 bath on main level, Bed/Bath upstairs, Laundry in basement  Home Access: Stairs to enter without rails  Entrance Stairs - Number of Steps: 3 KINGA  Bathroom Shower/Tub: Walk-in shower  Bathroom Toilet: Handicap height  Bathroom Accessibility: Walker accessible  Home Equipment: Cane, 4 wheeled walker  ADL Assistance: Independent  Homemaking Assistance: Independent  Ambulation Assistance: Independent  Transfer Assistance: Independent  Active : Yes  Mode of Transportation: Car  Occupation: Retired  Type of occupation: Retired teacher  Leisure & Hobbies: home exercise class 5x/week  Additional Comments: Denies falls in last 6 months       Objective   Vision: Impaired  Vision Exceptions: Wears glasses for reading  Hearing: Within functional limits    Orientation  Overall Orientation Status: Within Functional Limits  Observation/Palpation  Posture: Good  Balance  Sitting Balance: Independent  Standing Balance: Independent  Functional Mobility  Functional - Mobility Device: No device  Activity: Other  Assist Level: Independent  Functional Mobility Comments: 400' in hallway  ADL  Additional Comments: declines all ADL  Tone RUE  RUE Tone: Normotonic  Tone LUE  LUE Tone: Normotonic  Coordination  Movements Are Fluid And Coordinated: Yes     Bed mobility  Supine to Sit: Modified independent  Scooting: Modified independent  Transfers  Sit to stand: Independent  Stand to sit:  Independent     Cognition  Overall Cognitive Status: WNL  Perception  Overall Perceptual Status: WFL     Sensation  Overall Sensation Status: WNL(Denies numbness/tingling)        LUE AROM (degrees)  LUE AROM : WFL  RUE AROM (degrees)  RUE AROM : WFL                      Plan   Plan  Times per week: eval/dc    G-Code     OutComes Score                                                  AM-PAC Score        [facility] Inpatient Daily Activity Raw Score: 24 (05/04/21 1216)  AM-PAC Inpatient ADL T-Scale Score : 57.54 (05/04/21 1216)  ADL Inpatient CMS 0-100% Score: 0 (05/04/21 1216)  ADL Inpatient CMS G-Code Modifier : [address] (05/04/21 1216)    Goals  Short term goals  Time Frame for Short term goals: eval/dc       Therapy Time   Individual Concurrent Group Co-treatment   Time In 5128         Time Out 1015         Minutes 23            Timed Code Treatment Minutes:  8 Minutes    Total Treatment Minutes:  23 minutes    Marcial Gallagher OTR/L [id number]    Kacy Mitchell OT

## 2021-05-04 NOTE — PROGRESS NOTES
[facility]        PATIENT NAME: Brie Mills     TODAY'S DATE: 5/4/2021    SUBJECTIVE: CC abd pain   Pt with less pain today, feels better in lower abd, more like normal, passed gas, no BM. NO N/V. Has appetite returning, in good spirits. No CP or SOB. Current Medications:   Current Facility-Administered Medications: aspirin EC tablet 81 mg, 81 mg, Oral, Daily  atorvastatin (LIPITOR) tablet 20 mg, 20 mg, Oral, Nightly  escitalopram (LEXAPRO) tablet 5 mg, 5 mg, Oral, Daily  levothyroxine (SYNTHROID) tablet 75 mcg, 75 mcg, Oral, QAM AC  sodium chloride flush 0.9 % injection 5-40 mL, 5-40 mL, Intravenous, 2 times per day  sodium chloride flush 0.9 % injection 5-40 mL, 5-40 mL, Intravenous, PRN  0.9 % sodium chloride infusion, 25 mL, Intravenous, PRN  enoxaparin (LOVENOX) injection 40 mg, 40 mg, Subcutaneous, Daily  promethazine (PHENERGAN) tablet 12.5 mg, 12.5 mg, Oral, Q6H PRN **OR** ondansetron (ZOFRAN) injection 4 mg, 4 mg, Intravenous, Q6H PRN  polyethylene glycol (GLYCOLAX) packet 17 g, 17 g, Oral, Daily PRN  acetaminophen (TYLENOL) tablet 650 mg, 650 mg, Oral, Q6H PRN **OR** acetaminophen (TYLENOL) suppository 650 mg, 650 mg, Rectal, Q6H PRN  lactated ringers infusion, , Intravenous, Continuous  ciprofloxacin (CIPRO) IVPB 400 mg, 400 mg, Intravenous, Q12H  metronidazole (FLAGYL) 500 mg in NaCl 100 mL IVPB premix, 500 mg, Intravenous, Q8H  Prior to Admission medications    Medication Sig Start Date End Date Taking?  Authorizing Provider   ciprofloxacin (CIPRO) 500 MG tablet Take 1 tablet by mouth 2 times daily for 10 days 5/4/21 5/14/21 Yes Daysi Lopez MD   metroNIDAZOLE (FLAGYL) 500 MG tablet Take 1 tablet by mouth 3 times daily for 10 days 5/4/21 5/14/21 Yes Daysi Lopez MD   escitalopram (LEXAPRO) 5 MG tablet Take 5 mg by mouth daily 2/8/21  Yes Historical Provider, MD   atorvastatin (LIPITOR) 20 MG tablet Take 20 mg by mouth nightly   Yes Historical Provider, MD hydroCHLOROthiazide (HYDRODIURIL) 25 MG tablet Take 25 mg by mouth daily   Yes Historical Provider, MD   aspirin 81 MG EC tablet Take 1 tablet by mouth daily 10/7/20  Yes Peewee Rivera, MD   lisinopril (PRINIVIL;ZESTRIL) 10 MG tablet Take 20 mg by mouth daily    Yes Historical Provider, MD   levothyroxine (SYNTHROID) 75 MCG tablet Take 75 mcg by mouth nightly    Yes Historical Provider, MD   alendronate (FOSAMAX) 70 MG tablet Take 70 mg by mouth every 7 days   Yes Historical Provider, MD   latanoprost (XALATAN) 0.005 % ophthalmic solution Place 1 drop into both eyes nightly    Yes Historical Provider, MD        Allergies:  Patient has no known allergies. Social History:    reports that she quit smoking about 38 years ago. Her smoking use included cigarettes. She started smoking about 54 years ago. She has a 15.00 pack-year smoking history. She has never used smokeless tobacco. She reports current alcohol use of about 6.0 standard drinks of alcohol per week. She reports that she does not use drugs. Family History:        Problem Relation Age of Onset    Breast Cancer Mother     Cancer Mother     Colon Cancer Father     Cancer Sister     Other Sister     Cancer Brother        REVIEW OF SYSTEMS:  CONSTITUTIONAL:  negative  HEENT:  negative  RESPIRATORY:  negative  CARDIOVASCULAR:  negative  GASTROINTESTINAL:  negative  GENITOURINARY:  negative  HEMATOLOGIC/LYMPHATIC:  negative  NEUROLOGICAL:  negative  SKIN: negative      OBJECTIVE:  VITALS:  /79   Pulse 64   Temp 97.8 °F (36.6 °C) (Oral)   Resp 16   Ht 5' 4\" (1.626 m)   Wt 130 lb (59 kg)   SpO2 94%   BMI 22.31 kg/m²     INTAKE/OUTPUT:    I/O last 3 completed shifts: In: 2598.3 [I.V.:970.6; IV Piggyback:1627.7]  Out: -   I/O this shift: In: 592.5 [P.O.:300;  I.V.:219.9; IV Piggyback:72.7]  Out: -     CONSTITUTIONAL:  awake and alert  LUNGS:  clear to auscultation  HEART: RRR  ABDOMEN:  normal bowel sounds, soft, non-distended, non-tender Data:  CBC:   Recent Labs     05/03/21  1044 05/04/21  0448   WBC 7.1 4.2   HGB 13.7 12.4   HCT 40.0 36.3    177     BMP:    Recent Labs     05/03/21  1044 05/04/21  0448    139   K 4.3 3.6    107   CO2 27 23   BUN 15 9   CREATININE 0.8 0.7   GLUCOSE 100* 86     Hepatic:   Recent Labs     05/03/21  1044   AST 22   ALT 26   BILITOT 0.6   ALKPHOS 53     Mag:      Recent Labs     05/03/21  1044   MG 1.90      Phos:   No results for input(s): PHOS in the last 72 hours. INR: No results for input(s): INR in the last 72 hours.     Radiology Review:  AXR images with finding of gas in colon      ASSESSMENT AND PLAN:  SBO   Improved with bowel rest  Has had flatus  Adv to full liquids  Likely DC home in am if no recurring pain as diet advanced and she has a BM  Laparoscopy if any problems    Osvaldo Valentin

## 2021-05-04 NOTE — PROGRESS NOTES
[facility] PROGRESS NOTE    5/4/2021 1:24 PM        Name: Andres Guzman . Admitted: 5/3/2021  Primary Care Provider: LYNDON Valencia MD (Tel: [phone])                        Subjective:  . No acute events overnight. Resting well. Pain control. Diet ok. Labs reviewed. Reviewed interval ancillary notes    Current Medications  aspirin EC tablet 81 mg, Daily  atorvastatin (LIPITOR) tablet 20 mg, Nightly  escitalopram (LEXAPRO) tablet 5 mg, Daily  levothyroxine (SYNTHROID) tablet 75 mcg, QAM AC  sodium chloride flush 0.9 % injection 5-40 mL, 2 times per day  sodium chloride flush 0.9 % injection 5-40 mL, PRN  0.9 % sodium chloride infusion, PRN  enoxaparin (LOVENOX) injection 40 mg, Daily  promethazine (PHENERGAN) tablet 12.5 mg, Q6H PRN    Or  ondansetron (ZOFRAN) injection 4 mg, Q6H PRN  polyethylene glycol (GLYCOLAX) packet 17 g, Daily PRN  acetaminophen (TYLENOL) tablet 650 mg, Q6H PRN    Or  acetaminophen (TYLENOL) suppository 650 mg, Q6H PRN  lactated ringers infusion, Continuous  ciprofloxacin (CIPRO) IVPB 400 mg, Q12H  metronidazole (FLAGYL) 500 mg in NaCl 100 mL IVPB premix, Q8H        Objective:  /79   Pulse 64   Temp 97.8 °F (36.6 °C) (Oral)   Resp 16   Ht 5' 4\" (1.626 m)   Wt 130 lb (59 kg)   SpO2 94%   BMI 22.31 kg/m²     Intake/Output Summary (Last 24 hours) at 5/4/2021 1324  Last data filed at 5/4/2021 1045  Gross per 24 hour   Intake 2190.82 ml   Output    Net 2190.82 ml      Wt Readings from Last 3 Encounters:   05/03/21 130 lb (59 kg)   10/06/20 114 lb 13.8 oz (52.1 kg)   03/29/18 135 lb (61.2 kg)       General appearance:  Appears comfortable  Eyes: Sclera clear. Pupils equal.  ENT: Moist oral mucosa. Trachea midline, no adenopathy. Cardiovascular: Regular rhythm, normal S1, S2. No murmur.  No edema in lower extremities

## 2021-05-04 NOTE — PROGRESS NOTES
[facility]   Electrophysiology Progress Note     Date: 5/4/2021  Admit Date: 5/3/2021     Reason for consultation:Bradycardia    Chief Complaint:   Chief Complaint   Patient presents with    Loss of Consciousness     pt brought in by squad from home with c/o almost passing out. pt states she had some abdominal cramping before her shower, went ahead and jumped in but started to feel off while in the shower. after getting pt had 5 loose stools and almost passed out. per squad pt was bradycardic in the 40's, was given atropine 0.5 mg.  pt feeling better now. History of Present Illness: History obtained from patient and medical record. Leon Redman is a 65 y.o. female with a past medical history of HTN, HLD, DDD, and hypothyroidism    Pt  presented with hospital with syncope. Pt went to get in the shower this morning and began having abdominal cramping. While in the shower, the cramping worsened with lightheadedness. She then had 5 loose stools. Per EMS, HR was in the 40s and she was given a dose of atropine with improvement in her HR. EP consulted for bradycardia. Interval Hx: Today, she is being seen for follow up. Her HR has improved, pt remains in sinus rhythm in the 60-70s. Her blood pressure is stable. Her GI symptoms are improving, plans to start liquid diet today. Patient seen and examined. Clinical notes reviewed. Telemetry reviewed. No new complaints today. No major events overnight. Denies having chest pain, palpitations, shortness of breath, orthopnea/PND, cough, or dizziness at the time of this visit. Allergies:  No Known Allergies    Home Meds:  Prior to Visit Medications    Medication Sig Taking?  Authorizing Provider   ciprofloxacin (CIPRO) 500 MG tablet Take 1 tablet by mouth 2 times daily for 10 days Yes Bre Aquino MD   metroNIDAZOLE (FLAGYL) 500 MG tablet Take 1 tablet by mouth 3 times daily for 10 days Yes Bre Aquino MD   escitalopram (LEXAPRO) 5 MG tablet Take 5 mg by mouth daily Yes Historical Provider, MD   atorvastatin (LIPITOR) 20 MG tablet Take 20 mg by mouth nightly Yes Historical Provider, MD   hydroCHLOROthiazide (HYDRODIURIL) 25 MG tablet Take 25 mg by mouth daily Yes Historical Provider, MD   aspirin 81 MG EC tablet Take 1 tablet by mouth daily Yes Ovi Clemens MD   lisinopril (PRINIVIL;ZESTRIL) 10 MG tablet Take 20 mg by mouth daily  Yes Historical Provider, MD   levothyroxine (SYNTHROID) 75 MCG tablet Take 75 mcg by mouth nightly  Yes Historical Provider, MD   alendronate (FOSAMAX) 70 MG tablet Take 70 mg by mouth every 7 days Yes Historical Provider, MD   latanoprost (XALATAN) 0.005 % ophthalmic solution Place 1 drop into both eyes nightly  Yes Historical Provider, MD      Scheduled Meds:   aspirin  81 mg Oral Daily    atorvastatin  20 mg Oral Nightly    escitalopram  5 mg Oral Daily    levothyroxine  75 mcg Oral QAM AC    sodium chloride flush  5-40 mL Intravenous 2 times per day    enoxaparin  40 mg Subcutaneous Daily    ciprofloxacin  400 mg Intravenous Q12H    metroNIDAZOLE  500 mg Intravenous Q8H     Continuous Infusions:   sodium chloride Stopped (05/04/21 0329)    lactated ringers 100 mL/hr at 05/04/21 0820     PRN Meds:sodium chloride flush, sodium chloride, promethazine **OR** ondansetron, polyethylene glycol, acetaminophen **OR** acetaminophen     Past Medical History:  Past Medical History:   Diagnosis Date    Bulging lumbar disc     August 2020    Degenerative disc disease, lumbar 08/2020    Thyroid disease         Past Surgical History:    has a past surgical history that includes Blepharoplasty (Left) and Dental surgery. Social History:  Reviewed. reports that she quit smoking about 38 years ago. Her smoking use included cigarettes. She started smoking about 54 years ago. She has a 15.00 pack-year smoking history.  She has never used smokeless tobacco. She reports current alcohol use of about 6.0 standard drinks of alcohol per week. She reports that she does not use drugs. Family History:  Reviewed. family history includes Breast Cancer in her mother; Cancer in her brother, mother, and sister; Colon Cancer in her father; Other in her sister. Review of Systems:  · Constitutional: Negative for fever, night sweats, chills, weight changes, or weakness  · Skin: Negative for rash, dry skin, pruritus, bruising, bleeding, blood clots, or changes in skin pigment  · HEENT: Negative for vision changes, ringing in the ears, sore throat, dysphagia, or swollen lymph nodes  · Respiratory: Reviewed in HPI  · Cardiovascular: Reviewed in HPI  · Gastrointestinal: Negative for abdominal pain, N/V/D, constipation, or black/tarry stools  · Genito-Urinary: Negative for dysuria, incontinence, urgency, or hematuria  · Musculoskeletal: Negative for joint swelling, muscle pain, or injuries  · Neurological/Psych: Negative for confusion, seizures, headaches, balance issues or TIA-like symptoms. No anxiety, depression, or insomnia    Physical Examination:  Vitals:    05/04/21 0815   BP: 111/69   Pulse: 65   Resp: 17   Temp: 98.3 °F (36.8 °C)   SpO2: 95%      In: 1690.6 [I.V.:1190.5]  Out: -    Wt Readings from Last 3 Encounters:   05/03/21 130 lb (59 kg)   10/06/20 114 lb 13.8 oz (52.1 kg)   03/29/18 135 lb (61.2 kg)       Intake/Output Summary (Last 24 hours) at 5/4/2021 1018  Last data filed at 5/4/2021 0820  Gross per 24 hour   Intake 2890.82 ml   Output    Net 2890.82 ml       Telemetry: Personally Reviewed  - Sinus rhythm, rate 60-80s   · Constitutional: Cooperative and in no apparent distress, and appears well nourished  · Skin: Warm and pink; no pallor, cyanosis, bruising, or clubbing  · HEENT: Symmetric and normocephalic. PERRL, EOM intact. Conjunctiva pink with clear sclera. Mucus membranes pink and moist. Teeth intact. Thyroid smooth without nodules or goiter. · Cardiovascular: Regular rate and rhythm.  S1/S2 present without murmurs, rubs, or gallops. Peripheral pulses 2+, capillary refill < 3 seconds. No elevation of JVP. No peripheral edema  · Respiratory: Respirations symmetric and unlabored. Lungs clear to auscultation bilaterally, no wheezing, crackles, or rhonchi  · Gastrointestinal: Abdomen soft and round. Bowel sounds normoactive in all quadrants without tenderness or masses. · Musculoskeletal: Bilateral upper and lower extremity strength 5/5 with full ROM  · Neurologic/Psych: Awake and orientated to person, place and time. Calm affect, appropriate mood    Pertinent labs, diagnostic, device, and imaging results reviewed as a part of this visit    Labs:    BMP:   Recent Labs     21  1044 21  0448    139   K 4.3 3.6    107   CO2 27 23   BUN 15 9   CREATININE 0.8 0.7   MG 1.90  --      Estimated Creatinine Clearance: 63 mL/min (based on SCr of 0.7 mg/dL). CBC:   Recent Labs     21  1044 21  0448   WBC 7.1 4.2   HGB 13.7 12.4   HCT 40.0 36.3   .9* 100.7*    177     Thyroid: No results found for: TSH, [id number], U0CXYSP, THYROIDAB  Lipids:   Lab Results   Component Value Date    CHOL 192 10/06/2020    HDL 81 10/06/2020    TRIG 51 10/06/2020     LFTS:   Lab Results   Component Value Date    ALT 26 2021    AST 22 2021    ALKPHOS 53 2021    PROT 6.5 2021    AGRATIO 1.5 2021    BILITOT 0.6 2021     Cardiac Enzymes:   Lab Results   Component Value Date    TROPONINI <0.01 2021    TROPONINI <0.01 2021    TROPONINI <0.01 10/05/2020     Coags:   Lab Results   Component Value Date    PROTIME 11.8 10/05/2020    INR 1.02 10/05/2020     EC/3/21  Sinus rhythm, rate 85     ECHO: 10/20   -Overall left ventricular function is normal.   -Ejection fraction is visually estimated to be 55-60 %.  -The left ventricle was not well visualized.   -Mildly increased left ventricular wall thickness.   - Grade I diastolic dysfunction with normal LV filling pressures.  -Mild mitral regurgitation is present.   -Mild tricuspid regurgitation.   -A bubble study was performed and fails to show evidence of shunting.   -IVC size is normal (<2.1cm) and collapses > 50% with respiration consistent   with normal RA pressure (3mmHg).     CT Abd: 5/3/21  1. Fluid-filled loops of distal small bowel in the central abdomen without  significant distention.  Inflammatory stranding within the mesentery is also  seen.  Bowel distal and proximal to this is decompressed.  Transition point  appears to be located in the right lower quadrant suggestive of underlying  adhesion over focal enteritis.  Recommend following to resolution. 2. Small hiatal hernia. 3. Moderate diverticulosis.     Problem List:   Patient Active Problem List    Diagnosis Date Noted    Bradycardia 05/03/2021    Benign essential HTN     TIA (transient ischemic attack) 10/05/2020    Near syncope 03/29/2018    Hyponatremia 03/29/2018    Hypothyroidism (acquired) 03/29/2018    Glaucoma 03/29/2018        Assessment and Plan:        1. Bradycardia              - Currently in sinus rhythm, rate 60-80s              - Ok to resume BB at d/c              - No urgent EP intervention    - Discussed home monitoring of pulse rate, pt VU     2. Syncope              - Etiology likely vagal given GI issues/hx              - On IVF   - Discussed adequate hydration/PO intake     3. HTN   - Controlled: Goal <130/80   - Continue current medications     4. Mitral Regurgitation   - Mild per echo (10/20)   - Asymptomatic   - Will need routine echoes every few years to evaluate    5. Enteritis              - On ABX              - Surgery following     No further EP recommendations. No need for cardiology follow up. EP will sign off. Please call if there are any questions. Thank you for consult. All pertinent information and plan of care discussed with the EP physician. All questions and concerns were addressed to the patient.  Alternatives to my treatment were discussed. I have discussed the above stated plan with patient and the nurse. The patient verbalized understanding and agreed with the plan.     Thank you for allowing to us to participate in the care of [facility], [address]   Office: [phone]

## 2021-05-04 NOTE — PROGRESS NOTES
Physical/Occupational Therapy  Amy Lino  PT/OT orders noted and appreciated. Pt currently JACEK to radiology per chart review for xray of abdomen. PT/OT will follow-up with pt as schedule allows.   Thank you,  Yefri Sequeira, PT, DPT, [id number]  Татьяна Dupree, [place], OTR/L [id number]

## 2021-05-05 VITALS
RESPIRATION RATE: 16 BRPM | WEIGHT: 130 LBS | HEART RATE: 65 BPM | OXYGEN SATURATION: 96 % | DIASTOLIC BLOOD PRESSURE: 89 MMHG | SYSTOLIC BLOOD PRESSURE: 148 MMHG | BODY MASS INDEX: 22.2 KG/M2 | HEIGHT: 64 IN | TEMPERATURE: 97.4 F

## 2021-05-05 LAB
BILIRUBIN URINE: NEGATIVE
BLOOD, URINE: NEGATIVE
CLARITY: CLEAR
COLOR: YELLOW
GI BACTERIAL PATHOGENS BY PCR: NORMAL
GLUCOSE URINE: NEGATIVE MG/DL
KETONES, URINE: ABNORMAL MG/DL
LEUKOCYTE ESTERASE, URINE: NEGATIVE
MICROSCOPIC EXAMINATION: ABNORMAL
NITRITE, URINE: NEGATIVE
PH UA: 7.5 (ref 5–8)
PROTEIN UA: NEGATIVE MG/DL
SPECIFIC GRAVITY UA: <1.005 (ref 1–1.03)
URINE REFLEX TO CULTURE: ABNORMAL
URINE TYPE: ABNORMAL
UROBILINOGEN, URINE: 0.2 E.U./DL

## 2021-05-05 PROCEDURE — 81003 URINALYSIS AUTO W/O SCOPE: CPT

## 2021-05-05 PROCEDURE — 2580000003 HC RX 258: Performed by: INTERNAL MEDICINE

## 2021-05-05 PROCEDURE — 2500000003 HC RX 250 WO HCPCS: Performed by: INTERNAL MEDICINE

## 2021-05-05 PROCEDURE — 6370000000 HC RX 637 (ALT 250 FOR IP): Performed by: INTERNAL MEDICINE

## 2021-05-05 PROCEDURE — 6360000002 HC RX W HCPCS: Performed by: INTERNAL MEDICINE

## 2021-05-05 PROCEDURE — 99232 SBSQ HOSP IP/OBS MODERATE 35: CPT | Performed by: SURGERY

## 2021-05-05 PROCEDURE — 94760 N-INVAS EAR/PLS OXIMETRY 1: CPT

## 2021-05-05 RX ADMIN — Medication 10 ML: at 09:29

## 2021-05-05 RX ADMIN — ENOXAPARIN SODIUM 40 MG: 40 INJECTION SUBCUTANEOUS at 09:29

## 2021-05-05 RX ADMIN — CIPROFLOXACIN 400 MG: 2 INJECTION, SOLUTION INTRAVENOUS at 02:32

## 2021-05-05 RX ADMIN — METRONIDAZOLE 500 MG: 500 INJECTION, SOLUTION INTRAVENOUS at 06:42

## 2021-05-05 RX ADMIN — ASPIRIN 81 MG: 81 TABLET, COATED ORAL at 09:28

## 2021-05-05 RX ADMIN — LEVOTHYROXINE SODIUM 75 MCG: 75 TABLET ORAL at 06:43

## 2021-05-05 RX ADMIN — ESCITALOPRAM OXALATE 5 MG: 10 TABLET ORAL at 09:28

## 2021-05-05 ASSESSMENT — PAIN SCALES - GENERAL: PAINLEVEL_OUTOF10: 0

## 2021-05-05 NOTE — DISCHARGE SUMMARY
[facility] DISCHARGE SUMMARY    Patient Demographics    Patient. Cori Altus  Date of Birth. 1950  MRN. [id number]     Primary care provider. LYNDON Chahal MD  (Tel: [phone])    Admit date: 5/3/2021    Discharge date (blank if same as Note Date): Note Date: 5/5/2021     Reason for Hospitalization. Chief Complaint   Patient presents with    Loss of Consciousness     pt brought in by squad from home with c/o almost passing out. pt states she had some abdominal cramping before her shower, went ahead and jumped in but started to feel off while in the shower. after getting pt had 5 loose stools and almost passed out. per squad pt was bradycardic in the 40's, was given atropine 0.5 mg.  pt feeling better now. [facility] Course. Bradycardia  -Likely vasovagal.  Beta-blocker was discontinued  -Heart rate remained stable. Partial small bowel obstruction with some antritis  -Resolved treated conservatively n.p.o. on IV fluid  -Patient tolerated diet at the time of discharge pain control.  -P.o. antibiotics on discharge    Consults. IP CONSULT TO GENERAL SURGERY  IP CONSULT TO HOSPITALIST  IP CONSULT TO CARDIOLOGY    Physical examination on discharge day. BP (!) 148/89   Pulse 65   Temp 97.4 °F (36.3 °C) (Oral)   Resp 16   Ht 5' 4\" (1.626 m)   Wt 130 lb (59 kg)   SpO2 95%   BMI 22.31 kg/m²   General appearance. Alert. Looks comfortable. HEENT. Sclera clear. Moist mucus membranes. Cardiovascular. Regular rate and rhythm, normal S1, S2. No murmur. Respiratory. Not using accessory muscles. Clear to auscultation bilaterally, no wheeze. Gastrointestinal. Abdomen soft, non-tender, not distended, normal bowel sounds  Neurology. Facial symmetry. No speech deficits. Moving all extremities equally. Extremities. No edema in lower extremities. Skin.  Warm, dry, normal intraocular pressure  Side Effects: Blurred vision, eyelash changes     levothyroxine 75 MCG tablet  Commonly known as: SYNTHROID  Notes to patient: Use: to treat underactive thyroid, thyroid hormone replacement  Side effects: rapid or irregular heartbeat, fatigue, difficulty swallowing, irritability      lisinopril 10 MG tablet  Commonly known as: PRINIVIL;ZESTRIL  Notes to patient: Lisinopril (Zestril*, Prinivil)  Use: Lower blood pressure,  prevent heart failure  Side effects: Cough, headache, diarrhea, and dizziness, call nurse right away if lips or throat begin to swell        STOP taking these medications    Calcium 500-125 MG-UNIT Tabs     LORazepam 1 MG tablet  Commonly known as: ATIVAN     metoprolol succinate 25 MG extended release tablet  Commonly known as: TOPROL XL     multivitamin tablet           Where to Get Your Medications      These medications were sent to [facility] [address], [address], [facility]    Phone: [phone]   · ciprofloxacin 500 MG tablet  · metroNIDAZOLE 500 MG tablet         Discharge recommendations given to patient. Follow Up. pcp in 1 week   Disposition. home  Activity. activity as tolerated  Diet: DIET GENERAL;      Spent 45  minutes in discharge process.     Signed:  Yi Hinojosa MD     5/5/2021 1:47 PM

## 2021-05-05 NOTE — PROGRESS NOTES
Shift assessment complete. VSS. Pt ambulated to bathroom independently and tolerated well. No complaints of dizziness or fatigue. Call light within reach. No signs of distress. No further requests at this time. Will continue to monitor.

## 2021-05-05 NOTE — PROGRESS NOTES
Pt ambulated to bathroom and returned to bed. Urine sent to lab. Sheets changed. Call light in reach.

## 2021-05-05 NOTE — PROGRESS NOTES
[facility]        PATIENT NAME: Jonathan Gaffney     TODAY'S DATE: 5/5/2021    SUBJECTIVE: CC abd pain   Pt with no pain today, feels better in lower abd, passed gas, and BM. No N/V. Has appetite returning, in good spirits. [place]apphire fulls well. No CP or SOB. Current Medications:   Current Facility-Administered Medications: aspirin EC tablet 81 mg, 81 mg, Oral, Daily  atorvastatin (LIPITOR) tablet 20 mg, 20 mg, Oral, Nightly  escitalopram (LEXAPRO) tablet 5 mg, 5 mg, Oral, Daily  levothyroxine (SYNTHROID) tablet 75 mcg, 75 mcg, Oral, QAM AC  sodium chloride flush 0.9 % injection 5-40 mL, 5-40 mL, Intravenous, 2 times per day  sodium chloride flush 0.9 % injection 5-40 mL, 5-40 mL, Intravenous, PRN  0.9 % sodium chloride infusion, 25 mL, Intravenous, PRN  enoxaparin (LOVENOX) injection 40 mg, 40 mg, Subcutaneous, Daily  promethazine (PHENERGAN) tablet 12.5 mg, 12.5 mg, Oral, Q6H PRN **OR** ondansetron (ZOFRAN) injection 4 mg, 4 mg, Intravenous, Q6H PRN  polyethylene glycol (GLYCOLAX) packet 17 g, 17 g, Oral, Daily PRN  acetaminophen (TYLENOL) tablet 650 mg, 650 mg, Oral, Q6H PRN **OR** acetaminophen (TYLENOL) suppository 650 mg, 650 mg, Rectal, Q6H PRN  ciprofloxacin (CIPRO) IVPB 400 mg, 400 mg, Intravenous, Q12H  metronidazole (FLAGYL) 500 mg in NaCl 100 mL IVPB premix, 500 mg, Intravenous, Q8H  Prior to Admission medications    Medication Sig Start Date End Date Taking?  Authorizing Provider   ciprofloxacin (CIPRO) 500 MG tablet Take 1 tablet by mouth 2 times daily for 10 days 5/4/21 5/14/21 Yes Ashley Pollack MD   metroNIDAZOLE (FLAGYL) 500 MG tablet Take 1 tablet by mouth 3 times daily for 10 days 5/4/21 5/14/21 Yes Ashley Pollack MD   escitalopram (LEXAPRO) 5 MG tablet Take 5 mg by mouth daily 2/8/21  Yes Historical Provider, MD   atorvastatin (LIPITOR) 20 MG tablet Take 20 mg by mouth nightly   Yes Historical Provider, MD   hydroCHLOROthiazide (HYDRODIURIL) 25 MG tablet Take 25 mg by mouth daily   Yes Historical Provider, MD   aspirin 81 MG EC tablet Take 1 tablet by mouth daily 10/7/20  Yes Nallely Burch MD   lisinopril (PRINIVIL;ZESTRIL) 10 MG tablet Take 20 mg by mouth daily    Yes Historical Provider, MD   levothyroxine (SYNTHROID) 75 MCG tablet Take 75 mcg by mouth nightly    Yes Historical Provider, MD   alendronate (FOSAMAX) 70 MG tablet Take 70 mg by mouth every 7 days   Yes Historical Provider, MD   latanoprost (XALATAN) 0.005 % ophthalmic solution Place 1 drop into both eyes nightly    Yes Historical Provider, MD        Allergies:  Patient has no known allergies. Social History:    reports that she quit smoking about 38 years ago. Her smoking use included cigarettes. She started smoking about 54 years ago. She has a 15.00 pack-year smoking history. She has never used smokeless tobacco. She reports current alcohol use of about 6.0 standard drinks of alcohol per week. She reports that she does not use drugs. Family History:        Problem Relation Age of Onset    Breast Cancer Mother     Cancer Mother     Colon Cancer Father     Cancer Sister     Other Sister     Cancer Brother        REVIEW OF SYSTEMS:  CONSTITUTIONAL:  negative  HEENT:  negative  RESPIRATORY:  negative  CARDIOVASCULAR:  negative  GASTROINTESTINAL:  negative  GENITOURINARY:  negative  HEMATOLOGIC/LYMPHATIC:  negative  NEUROLOGICAL:  negative  SKIN: negative      OBJECTIVE:  VITALS:  /78   Pulse 64   Temp 98.1 °F (36.7 °C) (Oral)   Resp 17   Ht 5' 4\" (1.626 m)   Wt 130 lb (59 kg)   SpO2 95%   BMI 22.31 kg/m²     INTAKE/OUTPUT:    I/O last 3 completed shifts: In: 2357.8 [P.O.:1260; I.V.:742.3; IV Piggyback:355.5]  Out: -   I/O this shift:  In: 1109.9 [I.V.:709.5;  IV Piggyback:400.4]  Out: -     CONSTITUTIONAL:  awake and alert  LUNGS:  clear to auscultation  HEART: RRR  ABDOMEN:  normal bowel sounds, soft, non-distended, non-tender         Data:  CBC:   Recent Labs     05/03/21  1044 05/04/21  0448   WBC 7.1 4.2   HGB 13.7 12.4   HCT 40.0 36.3    177     BMP:    Recent Labs     05/03/21  1044 05/04/21  0448    139   K 4.3 3.6    107   CO2 27 23   BUN 15 9   CREATININE 0.8 0.7   GLUCOSE 100* 86     Hepatic:   Recent Labs     05/03/21  1044   AST 22   ALT 26   BILITOT 0.6   ALKPHOS 53     Mag:      Recent Labs     05/03/21  1044   MG 1.90      Phos:   No results for input(s): PHOS in the last 72 hours. INR: No results for input(s): INR in the last 72 hours.     Radiology Review:  AXR - no new today      ASSESSMENT AND PLAN:  SBO   Improved   Has had flatus and BM  Adv to reg diet  OK for DC home after po this am - mid day as long as no return of sx's  If happens again will do laparoscopy  Can see me in office prn for sx recurrence    Marivel Barba

## 2021-05-15 ENCOUNTER — APPOINTMENT (OUTPATIENT)
Dept: CT IMAGING | Age: 71
End: 2021-05-15
Payer: MEDICARE

## 2021-05-15 ENCOUNTER — HOSPITAL ENCOUNTER (EMERGENCY)
Age: 71
Discharge: HOME | End: 2021-05-15
Payer: MEDICARE

## 2021-05-15 VITALS
BODY MASS INDEX: 22.2 KG/M2 | OXYGEN SATURATION: 99 % | TEMPERATURE: 97.1 F | WEIGHT: 130 LBS | RESPIRATION RATE: 15 BRPM | HEIGHT: 64 IN | HEART RATE: 74 BPM | DIASTOLIC BLOOD PRESSURE: 79 MMHG | SYSTOLIC BLOOD PRESSURE: 126 MMHG

## 2021-05-15 DIAGNOSIS — S02.2XXA CLOSED FRACTURE OF NASAL BONE, INITIAL ENCOUNTER: Primary | ICD-10-CM
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
## 2021-05-15 PROCEDURE — 72125 CT NECK SPINE W/O DYE: CPT

## 2021-05-15 PROCEDURE — 70450 CT HEAD/BRAIN W/O DYE: CPT

## 2021-05-15 PROCEDURE — 90471 IMMUNIZATION ADMIN: CPT | Performed by: GENERAL ACUTE CARE HOSPITAL

## 2021-05-15 PROCEDURE — 6370000000 HC RX 637 (ALT 250 FOR IP): Performed by: GENERAL ACUTE CARE HOSPITAL

## 2021-05-15 PROCEDURE — 99283 EMERGENCY DEPT VISIT LOW MDM: CPT

## 2021-05-15 PROCEDURE — 70486 CT MAXILLOFACIAL W/O DYE: CPT

## 2021-05-15 PROCEDURE — 90715 TDAP VACCINE 7 YRS/> IM: CPT | Performed by: GENERAL ACUTE CARE HOSPITAL

## 2021-05-15 PROCEDURE — 6360000002 HC RX W HCPCS: Performed by: GENERAL ACUTE CARE HOSPITAL

## 2021-05-15 RX ORDER — HYDROCODONE BITARTRATE AND ACETAMINOPHEN 5; 325 MG/1; MG/1
1 TABLET ORAL ONCE
Status: COMPLETED | OUTPATIENT
Start: 2021-05-15 | End: 2021-05-15

## 2021-05-15 RX ORDER — HYDROCODONE BITARTRATE AND ACETAMINOPHEN 5; 325 MG/1; MG/1
1 TABLET ORAL EVERY 6 HOURS PRN
Qty: 12 TABLET | Refills: 0 | Status: SHIPPED | OUTPATIENT
Start: 2021-05-15 | End: 2021-05-18

## 2021-05-15 RX ORDER — GINSENG 100 MG
CAPSULE ORAL ONCE
Status: DISCONTINUED | OUTPATIENT
Start: 2021-05-15 | End: 2021-05-15 | Stop reason: HOSPADM

## 2021-05-15 RX ORDER — ONDANSETRON 4 MG/1
4 TABLET, ORALLY DISINTEGRATING ORAL ONCE
Status: COMPLETED | OUTPATIENT
Start: 2021-05-15 | End: 2021-05-15

## 2021-05-15 RX ADMIN — TETANUS TOXOID, REDUCED DIPHTHERIA TOXOID AND ACELLULAR PERTUSSIS VACCINE, ADSORBED 0.5 ML: 5; 2.5; 8; 8; 2.5 SUSPENSION INTRAMUSCULAR at 19:14

## 2021-05-15 RX ADMIN — ONDANSETRON 4 MG: 4 TABLET, ORALLY DISINTEGRATING ORAL at 19:14

## 2021-05-15 RX ADMIN — HYDROCODONE BITARTRATE AND ACETAMINOPHEN 1 TABLET: 5; 325 TABLET ORAL at 19:14

## 2021-05-15 ASSESSMENT — ENCOUNTER SYMPTOMS
BACK PAIN: 0
SINUS PAIN: 0
VOMITING: 0
SORE THROAT: 0
ABDOMINAL PAIN: 0
CHEST TIGHTNESS: 0
SHORTNESS OF BREATH: 0
NAUSEA: 0

## 2021-05-15 NOTE — ED PROVIDER NOTES
[address] [facility]        Pt Name: Mitch Mcneal  MRN: [id number]  [place] 1950  Date of evaluation: 5/15/2021  Provider: ALEXI Thorne - [facility]  PCP: LYNDON Vides MD  Note Started: 7:39 PM EDT       ISAIAH. I have evaluated this patient. My supervising physician was available for consultation. CHIEF COMPLAINT       Chief Complaint   Patient presents with    Fall     Pt reports she tripped over a curb and faceplanted onto concrete. Lac to left temple. Multiple lacs to nose. Denies LOC. +blood thinners. C/O pain to shoulders and neck. HISTORY OF PRESENT ILLNESS   (Location, Timing/Onset, Context/Setting, Quality, Duration, Modifying Factors, Severity, Associated Signs and Symptoms)  Note limiting factors. Mitch Mcneal is a 65 y.o. female who presents to the emergency department today for evaluation after mechanical fall. This fall was witnessed by her spouse. Patient states that she tripped over a curb and \"face planted on to the concrete\". There was no loss of consciousness. Patient takes a daily baby aspirin but no other blood thinners. Patient reports mild headache which she rates a 6 out of 10. She describes the pain as constant dull and throbbing. The pain does not radiate. She has not taken anything for symptoms. She denies having any dizziness, blurred vision, or extremity numbness or tingling. She reports mild neck pain. She denies other injury. She denies recent travel or known sick contacts. She states that she has felt well and has been without recent fever, chills, or other symptoms. Nursing Notes were all reviewed and agreed with or any disagreements were addressed in the HPI. REVIEW OF SYSTEMS    (2-9 systems for level 4, 10 or more for level 5)     Review of Systems   Constitutional: Negative for chills and fever. HENT: Positive for nosebleeds.  Negative for congestion, ear discharge, ear pain, sinus pain and sore throat. Eyes: Negative for visual disturbance. Respiratory: Negative for chest tightness and shortness of breath. Cardiovascular: Negative for chest pain and palpitations. Gastrointestinal: Negative for abdominal pain, nausea and vomiting. Endocrine: Negative for polydipsia and polyuria. Genitourinary: Negative for difficulty urinating and dysuria. Musculoskeletal: Positive for myalgias, neck pain and neck stiffness. Negative for arthralgias, back pain, gait problem and joint swelling. Skin: Positive for wound. Negative for rash. Superficial abrasions noted to bilateral hands and to bridge of nose. No active bleeding. Allergic/Immunologic: Negative for immunocompromised state. Neurological: Positive for headaches. Negative for dizziness, weakness, light-headedness and numbness. Hematological: Does not bruise/bleed easily. Psychiatric/Behavioral: Negative for suicidal ideas. Positives and Pertinent negatives as per HPI. Except as noted above in the ROS, all other systems were reviewed and negative.        PAST MEDICAL HISTORY     Past Medical History:   Diagnosis Date    Bulging lumbar disc     August 2020    Degenerative disc disease, lumbar 08/2020    Thyroid disease          SURGICAL HISTORY     Past Surgical History:   Procedure Laterality Date    BLEPHAROPLASTY Left     DENTAL SURGERY      implant         CURRENTMEDICATIONS       Discharge Medication List as of 5/15/2021  8:01 PM      CONTINUE these medications which have NOT CHANGED    Details   escitalopram (LEXAPRO) 5 MG tablet Take 5 mg by mouth dailyHistorical Med      atorvastatin (LIPITOR) 20 MG tablet Take 20 mg by mouth nightlyHistorical Med      aspirin 81 MG EC tablet Take 1 tablet by mouth daily, Disp-30 tablet, R-3Normal      lisinopril (PRINIVIL;ZESTRIL) 10 MG tablet Take 20 mg by mouth daily Historical Med      levothyroxine (SYNTHROID) 75 MCG tablet Take 75 mcg by mouth nightly Historical Med      alendronate (FOSAMAX) 70 MG tablet Take 70 mg by mouth every 7 daysHistorical Med      latanoprost (XALATAN) 0.005 % ophthalmic solution Place 1 drop into both eyes nightly Historical Med      hydroCHLOROthiazide (HYDRODIURIL) 25 MG tablet Take 25 mg by mouth dailyHistorical Med               ALLERGIES     Patient has no known allergies. FAMILYHISTORY       Family History   Problem Relation Age of Onset    Breast Cancer Mother     Cancer Mother     Colon Cancer Father     Cancer Sister     Other Sister     Cancer Brother           SOCIAL HISTORY       Social History     Tobacco Use    Smoking status: Former Smoker     Packs/day: 1.00     Years: 15.00     Pack years: 15.00     Types: Cigarettes     Start date: 1967     Quit date: 10/5/1982     Years since quittin.6    Smokeless tobacco: Never Used   Vaping Use    Vaping Use: Never used   Substance Use Topics    Alcohol use: Yes     Alcohol/week: 6.0 standard drinks     Types: 6 Glasses of wine per week     Comment: occ    Drug use: No       SCREENINGS             PHYSICAL EXAM    (up to 7 for level 4, 8 or more for level 5)     ED Triage Vitals [05/15/21 1818]   BP Temp Temp Source Pulse Resp SpO2 Height Weight   126/79 97.1 °F (36.2 °C) Infrared 74 15 99 % 5' 4\" (1.626 m) 130 lb (59 kg)       Physical Exam  Vitals and nursing note reviewed. Constitutional:       General: She is not in acute distress. Appearance: Normal appearance. She is not ill-appearing, toxic-appearing or diaphoretic. Comments: Appears uncomfortable   HENT:      Head: Normocephalic. No raccoon eyes or Valencia's sign. Comments: Ecchymosis swelling, and abrasion noted to nose. Right Ear: External ear normal.      Left Ear: External ear normal.      Nose: Signs of injury and nasal tenderness present. No nasal deformity, septal deviation, laceration, mucosal edema, congestion or rhinorrhea. Right Nostril: Epistaxis present.  No foreign body, septal hematoma or occlusion. Left Nostril: Epistaxis present. No foreign body, septal hematoma or occlusion. Right Sinus: No maxillary sinus tenderness or frontal sinus tenderness. Left Sinus: No maxillary sinus tenderness or frontal sinus tenderness. Eyes:      General:         Right eye: No discharge. Left eye: No discharge. Cardiovascular:      Rate and Rhythm: Normal rate and regular rhythm. Pulses: Normal pulses. Heart sounds: Normal heart sounds. Pulmonary:      Effort: Pulmonary effort is normal. No respiratory distress. Abdominal:      Palpations: Abdomen is soft. Tenderness: There is no abdominal tenderness. Musculoskeletal:         General: Normal range of motion. Cervical back: Normal range of motion and neck supple. Skin:     General: Skin is warm and dry. Capillary Refill: Capillary refill takes less than 2 seconds. Neurological:      General: No focal deficit present. Mental Status: She is alert and oriented to person, place, and time. Cranial Nerves: No cranial nerve deficit. Sensory: No sensory deficit. Motor: No weakness. Coordination: Coordination normal.      Gait: Gait normal.      Deep Tendon Reflexes: Reflexes normal.   Psychiatric:         Mood and Affect: Mood normal.         Behavior: Behavior normal.         DIAGNOSTIC RESULTS   LABS:    Labs Reviewed - No data to display    All other labs were within normal range or not returned as of this dictation. EKG: All EKG's are interpreted by the Emergency Department Physician in the absence of a cardiologist.  Please see their note for interpretation of EKG.       RADIOLOGY:   Non-plain film images such as CT, Ultrasound and MRI are read by the radiologist. Plain radiographic images are visualized and preliminarily interpreted by the ED Provider with the below findings:        Interpretation per the Radiologist below, if available at the time of this note:    CT CERVICAL SPINE WO CONTRAST   Final Result   Moderate multilevel cervical spine degenerative changes. No acute fracture   or subluxation. CT HEAD WO CONTRAST   Final Result   No evidence of acute intracranial process. CT MAXILLOFACIAL WO CONTRAST   Final Result   Acute comminuted nondisplaced bilateral nasal bone fractures. No additional   acute facial bone fracture, temporomandibular joint dislocation, or orbital   abnormality. Sinus inflammatory disease as above. CT HEAD WO CONTRAST    Result Date: 5/15/2021  EXAMINATION: CT OF THE HEAD WITHOUT CONTRAST  5/15/2021 5:51 pm TECHNIQUE: CT of the head was performed without the administration of intravenous contrast. Dose modulation, iterative reconstruction, and/or weight based adjustment of the mA/kV was utilized to reduce the radiation dose to as low as reasonably achievable. COMPARISON: October 5, 2020. HISTORY: ORDERING SYSTEM PROVIDED HISTORY: fall/headache/blood thinner TECHNOLOGIST PROVIDED HISTORY: Reason for exam:->fall/headache/blood thinner Has a \"code stroke\" or \"stroke alert\" been called? ->No Decision Support Exception - unselect if not a suspected or confirmed emergency medical condition->Emergency Medical Condition (MA) Reason for Exam: Fall (Pt reports she tripped over a curb and faceplanted onto concrete. Lac to left temple. Multiple lacs to nose. Denies LOC. +blood thinners. C/O pain to shoulders and neck. ) FINDINGS: BRAIN/VENTRICLES: The gyri and sulci have a normal appearance. Ventricles and extra-axial spaces appear normal. The gray-white matter differentiation is preserved throughout. There is no acute intracranial hemorrhage. No intra-axial or extra-axial mass or findings of mass effect. No shift of midline structures. No abnormal extra-axial fluid collections. No acute territorial infarct. ORBITS: The visualized portion of the orbits demonstrate no acute abnormality.  SINUSES: The mastoid air cells are normally aerated. The visualized paranasal sinuses are grossly clear. SOFT TISSUES/SKULL: No significant abnormality of the visualized skull or soft tissues. No acute fracture. No scalp hematoma. No evidence of acute intracranial process. CT CERVICAL SPINE WO CONTRAST    Result Date: 5/15/2021  EXAMINATION: CT OF THE CERVICAL SPINE WITHOUT CONTRAST 5/15/2021 5:51 pm TECHNIQUE: CT of the cervical spine was performed without the administration of intravenous contrast. Multiplanar reformatted images are provided for review. Dose modulation, iterative reconstruction, and/or weight based adjustment of the mA/kV was utilized to reduce the radiation dose to as low as reasonably achievable. COMPARISON: None. HISTORY: ORDERING SYSTEM PROVIDED HISTORY: fall/neck pain TECHNOLOGIST PROVIDED HISTORY: Reason for exam:->fall/neck pain Decision Support Exception - unselect if not a suspected or confirmed emergency medical condition->Emergency Medical Condition (MA) Reason for Exam: Fall (Pt reports she tripped over a curb and faceplanted onto concrete. Lac to left temple. Multiple lacs to nose. Denies LOC. +blood thinners. C/O pain to shoulders and neck. ) FINDINGS: BONES/ALIGNMENT:   Bone mineralization is normal.  The vertebral bodies and posterior elements appear intact and appropriately aligned without acute fracture or subluxation. Vertebral body stature is maintained throughout as is the normal cervical lordosis. DEGENERATIVE CHANGES: There is moderate multilevel cervical degenerative disc disease and uncovertebral joint hypertrophic change. No significant bony neural foraminal narrowing. SOFT TISSUES: No paraspinal soft tissue abnormality. The visualized lung apices are grossly clear. Moderate multilevel cervical spine degenerative changes. No acute fracture or subluxation.      CT MAXILLOFACIAL WO CONTRAST    Result Date: 5/15/2021  EXAMINATION: CT OF THE FACE WITHOUT CONTRAST  5/15/2021 5:51 pm TECHNIQUE: CT of the face was performed without the administration of intravenous contrast. Multiplanar reformatted images are provided for review. Dose modulation, iterative reconstruction, and/or weight based adjustment of the mA/kV was utilized to reduce the radiation dose to as low as reasonably achievable. COMPARISON: None HISTORY: ORDERING SYSTEM PROVIDED HISTORY: fall/nasal injury TECHNOLOGIST PROVIDED HISTORY: Reason for exam:->fall/nasal injury Decision Support Exception - unselect if not a suspected or confirmed emergency medical condition->Emergency Medical Condition (MA) Reason for Exam: Fall (Pt reports she tripped over a curb and faceplanted onto concrete. Lac to left temple. Multiple lacs to nose. Denies LOC. +blood thinners. C/O pain to shoulders and neck. ) FINDINGS: FACIAL BONES: There are acute comminuted nondisplaced fractures of the bilateral nasal bones with associated soft tissue swelling. The facial bones are otherwise intact, without additional acute fracture or destructive osseous abnormality. Heterotopic bone formation is present within the left maxillary sinus inferiorly related to a dental implant. The temporomandibular joints are positioned appropriately. ORBITS:  The globes appear intact. No intraorbital abnormality. No retrobulbar hematoma. The orbital walls and rims are intact. SINUSES/MASTOIDS: The mastoid air cells are normally aerated. There is partial minimal fluid opacification of the right sphenoid and right ethmoid sinuses. The visualized paranasal sinuses are otherwise clear. SOFT TISSUES: No significant facial soft tissue swelling or soft tissue hematoma. Acute comminuted nondisplaced bilateral nasal bone fractures. No additional acute facial bone fracture, temporomandibular joint dislocation, or orbital abnormality. Sinus inflammatory disease as above.            PROCEDURES   Unless otherwise noted below, none     Procedures    CRITICAL CARE TIME N/A    CONSULTS:  None      EMERGENCY DEPARTMENT COURSE and DIFFERENTIAL DIAGNOSIS/MDM:   Vitals:    Vitals:    05/15/21 1818   BP: 126/79   Pulse: 74   Resp: 15   Temp: 97.1 °F (36.2 °C)   TempSrc: Infrared   SpO2: 99%   Weight: 130 lb (59 kg)   Height: 5' 4\" (1.626 m)       Patient was given the following medications:  Medications   Tetanus-Diphth-Acell Pertussis (BOOSTRIX) injection 0.5 mL (0.5 mLs Intramuscular Given 5/15/21 1914)   ondansetron (ZOFRAN-ODT) disintegrating tablet 4 mg (4 mg Oral Given 5/15/21 1914)   HYDROcodone-acetaminophen (NORCO) 5-325 MG per tablet 1 tablet (1 tablet Oral Given 5/15/21 1914)       Nursing notes reviewed. This is a 68-year-old  female who presents to the emergency department today for evaluation after a witnessed mechanical fall which occurred just prior to arrival.  Patient \"face planted\" onto the concrete. There was no loss of consciousness. Physical exam complete. Patient is nontoxic, afebrile, normotensive. GCS is 15. She is without any focal neurologic deficits. She does appear uncomfortable. Patient is medicated with Zofran and Norco.  Her tetanus is updated. CT head and neck interpreted by radiologist are negative for acute findings. CT max face interpreted by radiologist shows Acute comminuted nondisplaced bilateral nasal bone fractures. No additional acute facial bone fracture, temporomandibular joint dislocation, or orbital abnormality. Sinus inflammatory disease as above. Wounds cleansed. Antibiotic ointment and sterile dressing applied to abrasions. Patient has remained hemodynamically stable throughout ED visit. She does report significant relief of symptoms after intervention. At this time there is no evidence of any life-threatening or emergent conditions requiring immediate intervention. She will be discharged with emphasis on close outpatient follow-up. Prescription for Norco provided.   She is advised to apply ice to the affected area for 20 minutes every 3-4 hours. She agrees to follow-up with the listed ENT specialist on Monday. She agrees return to nearest ED for high fever, incessant vomiting, severe pain, or any other worsening symptoms. She is discharged home with her spouse in stable condition. FINAL IMPRESSION      1. Closed fracture of nasal bone, initial encounter    2. Fall, initial encounter          DISPOSITION/PLAN   DISPOSITION        PATIENT REFERREDTO:  Marcia Ambrosio MD  [address] [id number]  [phone]      ENT specialist      DISCHARGE MEDICATIONS:  Discharge Medication List as of 5/15/2021  8:01 PM      START taking these medications    Details   HYDROcodone-acetaminophen (NORCO) 5-325 MG per tablet Take 1 tablet by mouth every 6 hours as needed for Pain for up to 3 days. Intended supply: 3 days.  Take lowest dose possible to manage pain, Disp-12 tablet, R-0Normal             DISCONTINUED MEDICATIONS:  Discharge Medication List as of 5/15/2021  8:01 PM                 (Please note that portions of this note were completed with a voice recognition program.  Efforts were made to edit the dictations but occasionally words are mis-transcribed.)    ALEXI Middleton CNP (electronically signed)            ALEXI Middleton CNP  05/16/21 0001

## 2021-05-17 ENCOUNTER — OFFICE VISIT (OUTPATIENT)
Dept: ENT CLINIC | Age: 71
End: 2021-05-17
Payer: MEDICARE

## 2021-05-17 VITALS — HEART RATE: 62 BPM | TEMPERATURE: 97.5 F | SYSTOLIC BLOOD PRESSURE: 141 MMHG | DIASTOLIC BLOOD PRESSURE: 93 MMHG
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
## 2021-05-17 DIAGNOSIS — S02.2XXA CLOSED FRACTURE OF NASAL BONE, INITIAL ENCOUNTER: ICD-10-CM

## 2021-05-17 DIAGNOSIS — S01.81XA LACERATION OF MULTIPLE SITES OF FACE: ICD-10-CM

## 2021-05-17 PROCEDURE — 3017F COLORECTAL CA SCREEN DOC REV: CPT | Performed by: STUDENT IN AN ORGANIZED HEALTH CARE EDUCATION/TRAINING PROGRAM

## 2021-05-17 PROCEDURE — 1090F PRES/ABSN URINE INCON ASSESS: CPT | Performed by: STUDENT IN AN ORGANIZED HEALTH CARE EDUCATION/TRAINING PROGRAM

## 2021-05-17 PROCEDURE — 1123F ACP DISCUSS/DSCN MKR DOCD: CPT | Performed by: STUDENT IN AN ORGANIZED HEALTH CARE EDUCATION/TRAINING PROGRAM

## 2021-05-17 PROCEDURE — 99204 OFFICE O/P NEW MOD 45 MIN: CPT | Performed by: STUDENT IN AN ORGANIZED HEALTH CARE EDUCATION/TRAINING PROGRAM

## 2021-05-17 PROCEDURE — 1111F DSCHRG MED/CURRENT MED MERGE: CPT | Performed by: STUDENT IN AN ORGANIZED HEALTH CARE EDUCATION/TRAINING PROGRAM

## 2021-05-17 PROCEDURE — G8399 PT W/DXA RESULTS DOCUMENT: HCPCS | Performed by: STUDENT IN AN ORGANIZED HEALTH CARE EDUCATION/TRAINING PROGRAM

## 2021-05-17 PROCEDURE — G8420 CALC BMI NORM PARAMETERS: HCPCS | Performed by: STUDENT IN AN ORGANIZED HEALTH CARE EDUCATION/TRAINING PROGRAM

## 2021-05-17 PROCEDURE — G8427 DOCREV CUR MEDS BY ELIG CLIN: HCPCS | Performed by: STUDENT IN AN ORGANIZED HEALTH CARE EDUCATION/TRAINING PROGRAM

## 2021-05-17 PROCEDURE — 1036F TOBACCO NON-USER: CPT | Performed by: STUDENT IN AN ORGANIZED HEALTH CARE EDUCATION/TRAINING PROGRAM

## 2021-05-17 PROCEDURE — 4040F PNEUMOC VAC/ADMIN/RCVD: CPT | Performed by: STUDENT IN AN ORGANIZED HEALTH CARE EDUCATION/TRAINING PROGRAM

## 2021-05-17 NOTE — PROGRESS NOTES
[address] [facility] SURGERY  NEW PATIENT HISTORY AND PHYSICAL NOTE      Patient Name: Rowan Drew  Medical Record Number:  [id number]  Primary Care Physician:  LYNDON Mansfield MD    ChiefComplaint     Chief Complaint   Patient presents with    Other     Tripped and fell on her face        History of Present Illness     Rowan Drew is an 65 y.o. female presenting with nasal bone fracture status post fall from standing that occurred 3 days ago. She was not paying attention was looking up when she tripped. No loss of consciousness. She had copious amounts of epistaxis after her fall but this was self-limiting. No blood thinner or anticoagulant use. No history of head or neck surgery. Denies nasal congestion after injury. She feels like her nose is significantly swollen and there might be a slight deviation to the left. Her teeth feel like they are coming together as they were in preinjury occlusion. Denies much pain, not taking any pain medications other than Tylenol. Past Medical History     Past Medical History:   Diagnosis Date    Bulging lumbar disc     2020    Degenerative disc disease, lumbar 2020    Thyroid disease        Past Surgical History     Past Surgical History:   Procedure Laterality Date    BLEPHAROPLASTY Left     DENTAL SURGERY      implant       Family History     Family History   Problem Relation Age of Onset    Breast Cancer Mother     Cancer Mother     Colon Cancer Father     Cancer Sister     Other Sister     Cancer Brother        Social History     Social History     Tobacco Use    Smoking status: Former Smoker     Packs/day: 1.00     Years: 15.00     Pack years: 15.00     Types: Cigarettes     Start date: 1967     Quit date: 10/5/1982     Years since quittin.6    Smokeless tobacco: Never Used   Vaping Use    Vaping Use: Never used   Substance Use Topics    Alcohol use:  Yes     Alcohol/week: 6.0 standard drinks     Types: 6 Glasses of wine per week     Comment: occ    Drug use: No        Allergies     No Known Allergies    Medications     Current Outpatient Medications   Medication Sig Dispense Refill    HYDROcodone-acetaminophen (NORCO) 5-325 MG per tablet Take 1 tablet by mouth every 6 hours as needed for Pain for up to 3 days. Intended supply: 3 days. Take lowest dose possible to manage pain 12 tablet 0    escitalopram (LEXAPRO) 5 MG tablet Take 5 mg by mouth daily      atorvastatin (LIPITOR) 20 MG tablet Take 20 mg by mouth nightly      hydroCHLOROthiazide (HYDRODIURIL) 25 MG tablet Take 25 mg by mouth daily      aspirin 81 MG EC tablet Take 1 tablet by mouth daily 30 tablet 3    lisinopril (PRINIVIL;ZESTRIL) 10 MG tablet Take 20 mg by mouth daily       levothyroxine (SYNTHROID) 75 MCG tablet Take 75 mcg by mouth nightly       alendronate (FOSAMAX) 70 MG tablet Take 70 mg by mouth every 7 days      latanoprost (XALATAN) 0.005 % ophthalmic solution Place 1 drop into both eyes nightly        No current facility-administered medications for this visit.        Review of Systems     REVIEW OF SYSTEMS  The following systems were reviewed and revealed the following in addition to any already discussed in the HPI:    CONSTITUTIONAL: no weight loss, no fever, no night sweats, no chills  EYES: no vision changes, no blurry vision  EARS: no changes in hearing, no otalgia  NOSE: +epistaxis, no rhinorrhea  THROAT: No voice changes, no sore throat, no dysphagia      PhysicalExam     Vitals:    05/17/21 1136   BP: (!) 141/93   Site: Left Upper Arm   Position: Sitting   Cuff Size: Medium Adult   Pulse: 62   Temp: 97.5 °F (36.4 °C)   TempSrc: Oral       PHYSICAL EXAM  BP (!) 141/93 (Site: Left Upper Arm, Position: Sitting, Cuff Size: Medium Adult)   Pulse 62   Temp 97.5 °F (36.4 °C) (Oral)     GENERAL: No acute distress, alert and oriented, no hoarseness  EYES: EOMI, Anti-icteric  NOSE: On anterior rhinoscopy there is no active epistaxis, there is small amount of dried blood in the left nasal vestibule. Nasal septum with slight deviation to the right. No evidence of acute nasal septal fracture. No septal hematoma. EARS: Normal external appearance; on portable otomicroscopy:     -Ad: External auditory canal without stenosis, tympanic membrane clear, no middle ear effusions or retractions.      -As: External auditory canal without stenosis, tympanic membrane clear, no middle ear effusions or retractions. Pneumatic otoscopy: Bilateral tympanic membranes mobile pneumatic otoscopy  FACE: HB 1/6 bilaterally, clear multiple small lacerations along her forehead and around her eyes and on her upper lip. There is significant edema of the nasal dorsum as well as the upper lip and both cheeks. There is ecchymotic changes infraorbitally bilaterally. ORAL CAVITY: No masses or lesions palpated, uvula is midline, moist mucous membranes. Upper lip edematous with small well approximated laceration with overlying scab. NECK: Normal range of motion, no thyromegaly, trachea is midline, no palpable lymphadenopathy or neck masses, no crepitus  NEURO: Cranial Nerves 2, 3, 4, 5, 6, 7, 11, 12 grossly intact bilaterally     I have performed a head and neck physical exam personally or was physically present during the key or critical portions of the service. Data/Imaging Review     Images from CT max face without contrast performed on 5/15/2021 independently reviewed by myself which demonstrate findings consistent with comminuted bilateral nasal bone fracture, left nasal bone fracture site slightly depressed. No evidence of septal hematoma. Assessment and Plan     1. Fall from standing, initial encounter  2. Closed fracture of nasal bone, initial encounter  3. Laceration of multiple sites of face    Plan:  -Recommend aggressive icing and ibuprofen to help decrease facial edema.   Ecchymosis and small abrasion should heal without consequence in time. -CT scan reviewed which demonstrates bilateral comminuted nasal bone fractures with very minimal displacement if any. She denies any new nasal congestion. Once the perinasal edema wears off she will reevaluate her nose and if she notes any cosmetic deformities in the form of the nasal bones shifted off to 1 side versus the other we can consider doing close reduction nasal bone fracture, but this would have to be done within the next 10 days or so.  -Continue bacitracin ointment as prescribed by the ED twice daily to laceration sites      Follow Up     Return if symptoms worsen or fail to improve. [facility]   Department of Otolaryngology/Head & Neck Surgery  5/17/21    Medical Decision Making: The following items were considered in medical decision making:  Independent review of images  Review / order clinical lab tests  Review / order radiology tests  Decision to obtain old records    Portions of this note were dictated using Dragon.  There may be linguistic errors secondary to the use of this program.

## 2021-08-10 ENCOUNTER — HOSPITAL ENCOUNTER (OUTPATIENT)
Dept: GENERAL RADIOLOGY | Age: 71
Discharge: HOME | End: 2021-08-10
Payer: MEDICARE

## 2021-08-10 DIAGNOSIS — M81.0 AGE-RELATED OSTEOPOROSIS WITHOUT CURRENT PATHOLOGICAL FRACTURE: ICD-10-CM

## 2021-08-10 PROCEDURE — 77080 DXA BONE DENSITY AXIAL: CPT

## 2021-08-11 ENCOUNTER — HOSPITAL ENCOUNTER (OUTPATIENT)
Dept: WOMENS IMAGING | Age: 71
Discharge: HOME | End: 2021-08-11
Payer: MEDICARE

## 2021-08-11 DIAGNOSIS — Z12.31 BREAST CANCER SCREENING BY MAMMOGRAM: ICD-10-CM

## 2021-08-11 PROCEDURE — 77063 BREAST TOMOSYNTHESIS BI: CPT

## 2022-08-12 ENCOUNTER — HOSPITAL ENCOUNTER (OUTPATIENT)
Dept: WOMENS IMAGING | Age: 72
Discharge: HOME | End: 2022-08-12
Payer: MEDICARE

## 2022-08-12 VITALS — BODY MASS INDEX: 25.52 KG/M2 | HEIGHT: 63 IN | WEIGHT: 144 LBS

## 2022-08-12 DIAGNOSIS — Z12.31 BREAST CANCER SCREENING BY MAMMOGRAM: ICD-10-CM

## 2022-08-12 PROCEDURE — 77063 BREAST TOMOSYNTHESIS BI: CPT

## 2023-08-23 ENCOUNTER — HOSPITAL ENCOUNTER (OUTPATIENT)
Dept: WOMENS IMAGING | Age: 73
Discharge: HOME | End: 2023-08-23
Payer: MEDICARE

## 2023-08-23 VITALS — WEIGHT: 146 LBS | HEIGHT: 64 IN | BODY MASS INDEX: 24.92 KG/M2

## 2023-08-23 DIAGNOSIS — Z12.31 BREAST CANCER SCREENING BY MAMMOGRAM: ICD-10-CM

## 2023-08-23 PROCEDURE — 77063 BREAST TOMOSYNTHESIS BI: CPT

## 2024-08-28 ENCOUNTER — HOSPITAL ENCOUNTER (OUTPATIENT)
Dept: WOMENS IMAGING | Age: 74
Discharge: HOME | End: 2024-08-28
Payer: MEDICARE

## 2024-08-28 VITALS — WEIGHT: 145 LBS | BODY MASS INDEX: 24.75 KG/M2 | HEIGHT: 64 IN

## 2024-08-28 DIAGNOSIS — Z12.31 VISIT FOR SCREENING MAMMOGRAM: ICD-10-CM

## 2024-08-28 PROCEDURE — 77063 BREAST TOMOSYNTHESIS BI: CPT

## 2025-01-07 ENCOUNTER — HOSPITAL ENCOUNTER (OUTPATIENT)
Dept: PHYSICAL THERAPY | Age: 75
Setting detail: THERAPIES SERIES
Discharge: HOME | End: 2025-01-07
Payer: MEDICARE

## 2025-01-07 DIAGNOSIS — R29.898 DECREASED STRENGTH OF LOWER EXTREMITY: Primary | ICD-10-CM

## 2025-01-07 DIAGNOSIS — M25.661 DECREASED RANGE OF MOTION OF RIGHT LOWER EXTREMITY: ICD-10-CM

## 2025-01-07 PROCEDURE — 97530 THERAPEUTIC ACTIVITIES: CPT

## 2025-01-07 PROCEDURE — 97110 THERAPEUTIC EXERCISES: CPT

## 2025-01-07 PROCEDURE — 97161 PT EVAL LOW COMPLEX 20 MIN: CPT

## 2025-01-07 NOTE — PLAN OF CARE
lumbar/proximal hip/LE joint hypomobility  Decreased LE functional ROM  Decreased core/proximal hip strength and neuromuscular control    Functional Activity Limitations (from functional questionnaire and intake):  Reduced ability to perform lifting, reaching, carrying tasks  Reduced ability to squat  Reduced ability to ascend/descend stairs    Participation Restrictions:   Reduced participation in social activities  Reduced participation in sports/recreation    Classification :   Signs/symptoms consistent with knee OA/hip OA  Signs/symptoms consistent with functional hip weakness/NMR control        Barriers to/and or personal factors that will affect rehab potential:   Age  Sex  Co-morbidities  past PT/medical experience    Physical Therapy Evaluation Complexity Justification  [x] A history of present problem and 1-2 personal factors and/or co-morbidities that impact the plan of care  [x] A total of 1-2 elements  found upon examination of body systems using standardized tests and measures addressing any of the following: body structures, functions (impairments), activity limitations, and/or participation restrictions  [x] A clinical presentation with stable and/or uncomplicated characteristics   [x] Clinical decision making of LOW (06658 - Typically 20 minutes face-to-face) complexity using standardized patient assessment instrument and/or measurable assessment of functional outcome.    Today's Assessment: See above    Medical Necessity Documentation:  I certify that this patient meets the below criteria necessary for medical necessity for care and/or justification of therapy services:  The patient has functional impairments and/or activity limitations and would benefit from continued outpatient therapy services to address the deficits outlined in the patients goals    Return to Play: NA    Prognosis for POC: [x] Good [] Fair  [] Poor    Patient requires continued skilled intervention: [x] Yes  [] No      CHARGE

## 2025-01-09 ENCOUNTER — HOSPITAL ENCOUNTER (OUTPATIENT)
Dept: PHYSICAL THERAPY | Age: 75
Setting detail: THERAPIES SERIES
Discharge: HOME | End: 2025-01-09
Payer: MEDICARE

## 2025-01-09 PROCEDURE — 97112 NEUROMUSCULAR REEDUCATION: CPT

## 2025-01-09 PROCEDURE — 97110 THERAPEUTIC EXERCISES: CPT

## 2025-01-09 PROCEDURE — 97530 THERAPEUTIC ACTIVITIES: CPT

## 2025-01-09 NOTE — PLAN OF CARE
facilitate improvement in movement, function, and ADLs as indicated by Functional Deficits.  [] Progressing: [] Met: [] Not Met: [] Adjusted    IF APPLICABLE:  [] Patient to demonstrate independence in wear and care for custom orthotic device. (Only if applicable for orthotic eval)     Long Term Goals: To be achieved in: 6 weeks  1. Disability index score of 50% or less for the LEFS to assist with reaching prior level of function with activities such as PLOF.  [] Progressing: [] Met: [] Not Met: [] Adjusted  2. Patient will demonstrate increased AROM of Hip flexion, IR/ER and hamstring length B by 15 degrees without pain to allow for proper joint functioning to enable patient to improve ROM required for ambulation and for proper form with squats and hip flexion needed for stair negotiation.   [] Progressing: [] Met: [] Not Met: [] Adjusted  3. Patient will demonstrate increased Strength of BLE to at least 4+/5 throughout without pain to allow for proper functional mobility to enable patient to return to jazzercise and stair negotiation without pain.   [] Progressing: [] Met: [] Not Met: [] Adjusted  4. Patient will return to climbing 1 flight of steps without handrails with reciprocal step without increased symptoms or restriction to demonstrate improvements in ADL performance at home.   [] Progressing: [] Met: [] Not Met: [] Adjusted        Overall Progression Towards Functional goals/ Treatment Progress Update:  [] Patient is progressing as expected towards functional goals listed.    [] Progression is slowed due to complexities/Impairments listed.  [] Progression has been slowed due to co-morbidities.  [x] Plan just implemented, too soon (<30days) to assess goals progression   [] Goals require adjustment due to lack of progress  [] Patient is not progressing as expected and requires additional follow up with physician  [] Other:     TREATMENT PLAN     Frequency/Duration: 2x/week for 6 weeks for the following

## 2025-01-14 ENCOUNTER — APPOINTMENT (OUTPATIENT)
Dept: PHYSICAL THERAPY | Age: 75
End: 2025-01-14
Payer: MEDICARE

## 2025-01-16 ENCOUNTER — HOSPITAL ENCOUNTER (OUTPATIENT)
Dept: PHYSICAL THERAPY | Age: 75
Setting detail: THERAPIES SERIES
Discharge: HOME | End: 2025-01-16
Payer: MEDICARE

## 2025-01-16 PROCEDURE — 97110 THERAPEUTIC EXERCISES: CPT

## 2025-01-16 PROCEDURE — 97530 THERAPEUTIC ACTIVITIES: CPT

## 2025-01-16 NOTE — FLOWSHEET NOTE
[facility] - Outpatient Rehabilitation and Therapy [address], [place] [address] office: [phone] fax: [phone]     Physical Therapy: TREATMENT/PROGRESS NOTE   Patient: Adriana Whitley (74 y.o. female)   Examination Date: 2025   :  1950 MRN: [id number]   Visit #: 3 /12  Insurance Allowable Auth Needed   [employer]  12 V approved 25 -3/8/25 []Yes    []No    Insurance: Payor: HUMANA MEDICARE / Plan: HUMANA GOLD PLUS HMO / Product Type: *No Product type* /   Insurance ID: [id number] - (Medicare Managed)  Secondary Insurance (if applicable):    Treatment Diagnosis:   No diagnosis found.     Medical Diagnosis:  Osteoarthritis of knees, bilateral [M17.0]   Referring Physician: Mark Vasquez MD  PCP: Manda Gomez MD     Plan of care signed (Y/N):     Date of Patient follow up with Physician: MD through [place]     Plan of Care Report: NO  POC update due: (10 visits /OR AUTH LIMITS, whichever is less)  2025                                             Medical History:  Comorbidities:  None  Relevant Medical History: TIA few years ago, osteoporosis                                          Precautions/ Contra-indications:           Latex allergy:  NO  Pacemaker:    NO  Contraindications for Manipulation: None  Date of Surgery: NA  Other:    Red Flags:  None    Suicide Screening:   The patient did not verbalize a primary behavioral concern, suicidal ideation, suicidal intent, or demonstrate suicidal behaviors.    Preferred Language for Healthcare:   [x] English       [] other:    SUBJECTIVE EXAMINATION   Current c/o: Pt reports continued pain with going up and down stairs.     Eval: States that she was told to come to PT d/t OA. Noticed L knee pain in March/April and the R started shortly after. States she has most pain with going up/down stairs, with up being worse. States her pain is in the knee cap. Does jazzercise 4 hours a week (MTWF). Is planning on moving this

## 2025-01-21 ENCOUNTER — HOSPITAL ENCOUNTER (OUTPATIENT)
Dept: PHYSICAL THERAPY | Age: 75
Setting detail: THERAPIES SERIES
Discharge: HOME | End: 2025-01-21
Payer: MEDICARE

## 2025-01-21 PROCEDURE — 97110 THERAPEUTIC EXERCISES: CPT

## 2025-01-21 NOTE — FLOWSHEET NOTE
[facility] - Outpatient Rehabilitation and Therapy [address], [place] [address] office: [phone] fax: [phone]     Physical Therapy: TREATMENT/PROGRESS NOTE   Patient: Adriana Whitley (74 y.o. female)   Examination Date: 2025   :  1950 MRN: [id number]   Visit #:   Insurance Allowable Auth Needed   [employer]  12 V approved 25 -3/8/25 []Yes    []No    Insurance: Payor: HUMANA MEDICARE / Plan: HUMANA GOLD PLUS HMO / Product Type: *No Product type* /   Insurance ID: [id number] - (Medicare Managed)  Secondary Insurance (if applicable):    Treatment Diagnosis:   Osteoarthritis of knees, bilateral [M17.0]      Medical Diagnosis:  Osteoarthritis of knees, bilateral [M17.0]   Referring Physician: Mark Vasquez MD  PCP: Manda Gomez MD     Plan of care signed (Y/N):     Date of Patient follow up with Physician: MD through [place]     Plan of Care Report: NO  POC update due: (10 visits /OR AUTH LIMITS, whichever is less)  2025                                             Medical History:  Comorbidities:  None  Relevant Medical History: TIA few years ago, osteoporosis                                          Precautions/ Contra-indications:           Latex allergy:  NO  Pacemaker:    NO  Contraindications for Manipulation: None  Date of Surgery: NA  Other:    Red Flags:  None    Suicide Screening:   The patient did not verbalize a primary behavioral concern, suicidal ideation, suicidal intent, or demonstrate suicidal behaviors.    Preferred Language for Healthcare:   [x] English       [] other:    SUBJECTIVE EXAMINATION   Current c/o: States she is doing okay. States she has some low back discomfort and some R knee discomfort when she goes up/down the stairs. Had jazzercise this morning. Exercises are going okay at home. Sometimes uses voltaren gel. Was tired after last session.    Eval: States that she was told to come to PT d/t OA. Noticed L knee pain in

## 2025-01-23 ENCOUNTER — HOSPITAL ENCOUNTER (OUTPATIENT)
Dept: PHYSICAL THERAPY | Age: 75
Setting detail: THERAPIES SERIES
Discharge: HOME | End: 2025-01-23
Payer: MEDICARE

## 2025-01-23 PROCEDURE — 97530 THERAPEUTIC ACTIVITIES: CPT

## 2025-01-23 PROCEDURE — 97110 THERAPEUTIC EXERCISES: CPT

## 2025-01-23 PROCEDURE — 97112 NEUROMUSCULAR REEDUCATION: CPT

## 2025-01-23 NOTE — FLOWSHEET NOTE
[] Met: [] Not Met: [] Adjusted  3. Patient will demonstrate increased Strength of BLE to at least 4+/5 throughout without pain to allow for proper functional mobility to enable patient to return to jazzercise and stair negotiation without pain.   [] Progressing: [] Met: [] Not Met: [] Adjusted  4. Patient will return to climbing 1 flight of steps without handrails with reciprocal step without increased symptoms or restriction to demonstrate improvements in ADL performance at home.   [] Progressing: [] Met: [] Not Met: [] Adjusted      Overall Progression Towards Functional goals/ Treatment Progress Update:  [] Patient is progressing as expected towards functional goals listed.    [] Progression is slowed due to complexities/Impairments listed.  [] Progression has been slowed due to co-morbidities.  [x] Plan just implemented, too soon (<30days) to assess goals progression   [] Goals require adjustment due to lack of progress  [] Patient is not progressing as expected and requires additional follow up with physician  [] Other:     TREATMENT PLAN     Frequency/Duration: 2x/week for 6 weeks for the following treatment interventions:    Interventions:  Therapeutic Exercise (04878) including: strength training, ROM, and functional mobility  Therapeutic Activities (90239) including: functional mobility training and education.  Neuromuscular Re-education (11852) activation and proprioception, including postural re-education.    Gait Training (64073) for normalization of ambulation patterns and AD training.   Manual Therapy (10411) as indicated to include: Passive Range of Motion, Gr I-IV mobilizations, and Trigger Point Release  Modalities as needed that may include: Electrical Stimulation and Thermal Agents  Patient education on joint protection, postural re-education, activity modification, and progression of HEP    Plan: Cont POC- Continue emphasis/focus on exercise progression, improving proper muscle recruitment and

## 2025-01-28 ENCOUNTER — HOSPITAL ENCOUNTER (OUTPATIENT)
Dept: PHYSICAL THERAPY | Age: 75
Setting detail: THERAPIES SERIES
Discharge: HOME | End: 2025-01-28
Payer: MEDICARE

## 2025-01-28 PROCEDURE — 97530 THERAPEUTIC ACTIVITIES: CPT

## 2025-01-28 PROCEDURE — 97110 THERAPEUTIC EXERCISES: CPT

## 2025-01-28 PROCEDURE — 97112 NEUROMUSCULAR REEDUCATION: CPT

## 2025-01-28 NOTE — FLOWSHEET NOTE
[facility] - Outpatient Rehabilitation and Therapy [address], [place] [address] office: [phone] fax: [phone]     Physical Therapy: TREATMENT/PROGRESS NOTE   Patient: Adriana Whitley (74 y.o. female)   Examination Date: 2025   :  1950 MRN: [id number]   Visit #:   Insurance Allowable Auth Needed   [employer]  12 V approved 25 -3/8/25 []Yes    []No    Insurance: Payor: HUMANA MEDICARE / Plan: HUMANA GOLD PLUS HMO / Product Type: *No Product type* /   Insurance ID: [id number] - (Medicare Managed)  Secondary Insurance (if applicable):    Treatment Diagnosis:   Osteoarthritis of knees, bilateral [M17.0]      Medical Diagnosis:  Osteoarthritis of knees, bilateral [M17.0]   Referring Physician: Mark Vasquez MD  PCP: Manda Gomez MD     Plan of care signed (Y/N):     Date of Patient follow up with Physician: MD through [place]     Plan of Care Report: NO  POC update due: (10 visits /OR AUTH LIMITS, whichever is less)  2025                                             Medical History:  Comorbidities:  None  Relevant Medical History: TIA few years ago, osteoporosis                                          Precautions/ Contra-indications:           Latex allergy:  NO  Pacemaker:    NO  Contraindications for Manipulation: None  Date of Surgery: NA  Other:    Red Flags:  None    Suicide Screening:   The patient did not verbalize a primary behavioral concern, suicidal ideation, suicidal intent, or demonstrate suicidal behaviors.    Preferred Language for Healthcare:   [x] English       [] other:    SUBJECTIVE EXAMINATION   Current c/o: Doing well.     Eval: States that she was told to come to PT d/t OA. Noticed L knee pain in March/April and the R started shortly after. States she has most pain with going up/down stairs, with up being worse. States her pain is in the knee cap. Does jazzercise 4 hours a week (MTWF). [place]

## 2025-02-04 ENCOUNTER — HOSPITAL ENCOUNTER (OUTPATIENT)
Dept: PHYSICAL THERAPY | Age: 75
Setting detail: THERAPIES SERIES
Discharge: HOME | End: 2025-02-04
Payer: MEDICARE

## 2025-02-04 PROCEDURE — 97530 THERAPEUTIC ACTIVITIES: CPT

## 2025-02-04 PROCEDURE — 97110 THERAPEUTIC EXERCISES: CPT

## 2025-02-04 NOTE — PLAN OF CARE
[facility] - Outpatient Rehabilitation and Therapy [address], [place] [address] office: [phone] fax: [phone]     Physical Therapy Re-Certification Plan of Care    Dear Mark Vasquez MD  ,    We had the pleasure of treating the following patient for physical therapy services at [facility] Outpatient Physical Therapy. A summary of our findings can be found in the updated assessment below.  This includes our plan of care.  If you have any questions or concerns regarding these findings, please do not hesitate to contact me at the office phone number checked above.  Thank you for the referral.     Physician Signature:________________________________Date:__________________  By signing above (or electronic signature), therapist's plan is approved by physician      Total Visits: 7     Overall Response to Treatment:  Patient is responding well to treatment and improvement is noted with regards to goals and Perri as attended 7 sessions of therapy and has made some improvements in her strength and BLE ROM but continue to demonstrate tightness in her B hamstrings and weakness in her hip musculature. Good quad and HS strength noted. With STS has increase in LLE WB and discussed utilizing mirror for STS exercises for equal BLE WB. Continues to demonstrate B hip adduction with exercises requiring v/c for proper mechanics. Will continue to benefit from skilled therapy.       Recommendation:    [x] Continue PT 1x / wk for 5 weeks.   [] Hold PT, pending MD visit   [] Discharge to [facility]. Follow up with PT or MD PRN.     Physical Therapy: TREATMENT/PROGRESS NOTE   Patient: Adriana Whitley (74 y.o. female)   Examination Date: 2025   :  1950 MRN: [id number]   Visit #:   Insurance Allowable Auth Needed   Hum[employer]  12 V approved 25 -3/8/25 [x]Yes    []No    Insurance: Payor: HUMANA MEDICARE / Plan: HUMANA GOLD PLUS HMO / Product Type: *No Product type* /   Insurance ID: [id number] -

## 2025-02-11 ENCOUNTER — HOSPITAL ENCOUNTER (OUTPATIENT)
Dept: PHYSICAL THERAPY | Age: 75
Setting detail: THERAPIES SERIES
Discharge: HOME | End: 2025-02-11
Payer: MEDICARE

## 2025-02-11 PROCEDURE — 97140 MANUAL THERAPY 1/> REGIONS: CPT

## 2025-02-11 PROCEDURE — 97110 THERAPEUTIC EXERCISES: CPT

## 2025-02-11 NOTE — FLOWSHEET NOTE
either an injury or surgery.   (88569) MANUAL THERAPY -  Manual therapy techniques, 1 or more regions, each 15 minutes (Mobilization/manipulation, manual lymphatic drainage, manual traction) for the purpose of modulating pain, promoting relaxation,  increasing ROM, reducing/eliminating soft tissue swelling/inflammation/restriction, improving soft tissue extensibility and allowing for proper ROM for normal function with self care, mobility, lifting and ambulation    GOALS     Patient stated goal: decreasing pain with stairs   [] Progressing: [] Met: [] Not Met: [] Adjusted    Therapist goals for Patient:   Short Term Goals: To be achieved in: 2 weeks  1. Independent in HEP and progression per patient tolerance, in order to prevent re-injury.   [] Progressing: [x] Met: [] Not Met: [] Adjusted  2. Patient will have a decrease in pain to <2/10 to facilitate improvement in movement, function, and ADLs as indicated by Functional Deficits.  [] Progressing: [x] Met: [] Not Met: [] Adjusted      Long Term Goals: To be achieved in: 6 weeks  1. Disability index score of 50% or less for the LEFS to assist with reaching prior level of function with activities such as PLOF.  [] Progressing: [x] Met: [] Not Met: [] Adjusted  2. Patient will demonstrate increased AROM of Hip flexion and hamstring length B by 15 degrees without pain to allow for proper joint functioning to enable patient to improve ROM required for ambulation and for proper form with squats and hip flexion needed for stair negotiation.   [x] Progressing: [] Met: [] Not Met: [] Adjusted  3. Patient will demonstrate increased Strength of BLE to at least 4+/5 throughout without pain to allow for proper functional mobility to enable patient to return to jazzercise and stair negotiation without pain.   [x] Progressing: [] Met: [] Not Met: [] Adjusted  4. Patient will return to climbing 1 flight of steps without handrails with reciprocal step without increased symptoms or

## 2025-02-18 ENCOUNTER — HOSPITAL ENCOUNTER (OUTPATIENT)
Dept: PHYSICAL THERAPY | Age: 75
Setting detail: THERAPIES SERIES
Discharge: HOME | End: 2025-02-18
Payer: MEDICARE

## 2025-02-18 PROCEDURE — 97530 THERAPEUTIC ACTIVITIES: CPT

## 2025-02-18 PROCEDURE — 97140 MANUAL THERAPY 1/> REGIONS: CPT

## 2025-02-18 PROCEDURE — 97110 THERAPEUTIC EXERCISES: CPT

## 2025-02-18 NOTE — FLOWSHEET NOTE
[facility] - Outpatient Rehabilitation and Therapy [address], [place] [address] office: [phone] fax: [phone]     Physical Therapy: TREATMENT/PROGRESS NOTE   Patient: Adriana Whitley (74 y.o. female)   Examination Date: 2025   :  1950 MRN: [id number]   Visit #:   Insurance Allowable Auth Needed   Hum[employer]  12 V approved 25 -3/8/25 [x]Yes    []No    Insurance: Payor: HUMANA MEDICARE / Plan: HUMANA GOLD PLUS HMO / Product Type: *No Product type* /   Insurance ID: [id number] - (Medicare Managed)  Secondary Insurance (if applicable):    Treatment Diagnosis:   Osteoarthritis of knees, bilateral [M17.0]      Medical Diagnosis:  Osteoarthritis of knees, bilateral [M17.0]   Referring Physician: Mark Vasquez MD  PCP: Manda Gomez MD     Plan of care signed (Y/N):     Date of Patient follow up with Physician: MD through [place]     Plan of Care Report: YES, Date Range for this report: 25 to 25  POC update due: (10 visits /OR AUTH LIMITS, whichever is less)  3/4/2025                                             Medical History:  Comorbidities:  None  Relevant Medical History: TIA few years ago, osteoporosis                                          Precautions/ Contra-indications:           Latex allergy:  NO  Pacemaker:    NO  Contraindications for Manipulation: None  Date of Surgery: NA  Other:    Red Flags:  None    Suicide Screening:   The patient did not verbalize a primary behavioral concern, suicidal ideation, suicidal intent, or demonstrate suicidal behaviors.    Preferred Language for Healthcare:   [x] English       [] other:    SUBJECTIVE EXAMINATION   Current c/o:  Continues to have discomfort with stairs. Has f/u with Jason next week. Has been doing her HEP at home.     Eval: States that she was told to come to PT d/t OA. Noticed L knee pain in March/April and the R started shortly after. States she has most pain with going up/down

## 2025-02-25 ENCOUNTER — HOSPITAL ENCOUNTER (OUTPATIENT)
Dept: PHYSICAL THERAPY | Age: 75
Setting detail: THERAPIES SERIES
Discharge: HOME | End: 2025-02-25
Payer: MEDICARE

## 2025-02-25 NOTE — FLOWSHEET NOTE
Progressing: [x] Met: [] Not Met: [] Adjusted      Long Term Goals: To be achieved in: 6 weeks  1. Disability index score of 50% or less for the LEFS to assist with reaching prior level of function with activities such as PLOF.  [] Progressing: [x] Met: [] Not Met: [] Adjusted  2. Patient will demonstrate increased AROM of Hip flexion and hamstring length B by 15 degrees without pain to allow for proper joint functioning to enable patient to improve ROM required for ambulation and for proper form with squats and hip flexion needed for stair negotiation.   [x] Progressing: [] Met: [] Not Met: [] Adjusted  3. Patient will demonstrate increased Strength of BLE to at least 4+/5 throughout without pain to allow for proper functional mobility to enable patient to return to jazzercise and stair negotiation without pain.   [x] Progressing: [] Met: [] Not Met: [] Adjusted  4. Patient will return to climbing 1 flight of steps without handrails with reciprocal step without increased symptoms or restriction to demonstrate improvements in ADL performance at home.   [x] Progressing: [] Met: [] Not Met: [] Adjusted 2/4: unilateral UE support, slow speed, B anterior knee pain  5. Patient will demonstrate improvements in BLE functional mobility noted by completed 15 reps during 30\"STS with proper form and equal BLE weight bearing.   [] Progressing: [] Met: [] Not Met: [x] Adjusted - added 2/4/25    Overall Progression Towards Functional goals/ Treatment Progress Update:  [x] Patient is progressing as expected towards functional goals listed.    [] Progression is slowed due to complexities/Impairments listed.  [] Progression has been slowed due to co-morbidities.  [] Plan just implemented, too soon (<30days) to assess goals progression   [] Goals require adjustment due to lack of progress  [] Patient is not progressing as expected and requires additional follow up with physician  [] Other:     TREATMENT PLAN     Frequency/Duration:

## 2025-03-03 ENCOUNTER — TRANSCRIBE ORDERS (OUTPATIENT)
Dept: ADMINISTRATIVE | Age: 75
End: 2025-03-03

## 2025-03-03 DIAGNOSIS — M81.0 AGE-RELATED OSTEOPOROSIS WITHOUT CURRENT PATHOLOGICAL FRACTURE: Primary | ICD-10-CM

## 2025-03-04 ENCOUNTER — HOSPITAL ENCOUNTER (OUTPATIENT)
Dept: PHYSICAL THERAPY | Age: 75
Setting detail: THERAPIES SERIES
Discharge: HOME | End: 2025-03-04
Payer: MEDICARE

## 2025-03-04 PROCEDURE — 97530 THERAPEUTIC ACTIVITIES: CPT

## 2025-03-04 PROCEDURE — 97140 MANUAL THERAPY 1/> REGIONS: CPT

## 2025-03-04 PROCEDURE — 97110 THERAPEUTIC EXERCISES: CPT

## 2025-03-04 NOTE — PLAN OF CARE
[facility] - Outpatient Rehabilitation and Therapy [address], [place] [address] office: [phone] fax: [phone]    Physical Therapy Re-Certification Plan of Care    Dear Mark Vasquez MD  ,    We had the pleasure of treating the following patient for physical therapy services at [facility] Outpatient Physical Therapy. A summary of our findings can be found in the updated assessment below.  This includes our plan of care.  If you have any questions or concerns regarding these findings, please do not hesitate to contact me at the office phone number checked above.  Thank you for the referral.     Physician Signature:________________________________Date:__________________  By signing above (or electronic signature), therapist's plan is approved by physician      Total Visits: 10     Overall Response to Treatment:  Perri has attended 10 sessions of therapy and has made improvements in her BLE ROM and slight improvements in her BLE strengths. This date she rated more difficulty with her self reported functional mobility despite improvements in ROM/strength. Is getting gel injections soon. Trialed ASTIM this date with RLE quad/ITB with good tolerance and decrease in adhesions noted. Will assess response at [facility]. Will continue to benefit  from skilled therapy.     Recommendation:    [x] Continue PT 2x / wk for 6 weeks.   [] Hold PT, pending MD visit   [] Discharge to [facility]. Follow up with PT or MD PRN.      Physical Therapy: TREATMENT/PROGRESS NOTE   Patient: Adriana Whitley (74 y.o. female)   Examination Date: 2025   :  1950 MRN: [id number]   Visit #: 10 /12  Insurance Allowable Auth Needed   [employer]  12 V approved 25 -3/8/25 [x]Yes    []No    Insurance: Payor: HUMANA MEDICARE / Plan: HUMANA GOLD PLUS HMO / Product Type: *No Product type* /   Insurance ID: [id number] - (Medicare Managed)  Secondary Insurance (if applicable):    Treatment Diagnosis:   Osteoarthritis of

## 2025-03-18 ENCOUNTER — HOSPITAL ENCOUNTER (OUTPATIENT)
Dept: PHYSICAL THERAPY | Age: 75
Setting detail: THERAPIES SERIES
Discharge: HOME | End: 2025-03-18
Payer: MEDICARE

## 2025-03-18 PROCEDURE — 97140 MANUAL THERAPY 1/> REGIONS: CPT

## 2025-03-18 PROCEDURE — 97110 THERAPEUTIC EXERCISES: CPT

## 2025-03-18 PROCEDURE — 97530 THERAPEUTIC ACTIVITIES: CPT

## 2025-03-18 NOTE — FLOWSHEET NOTE
jazzercise, exercise, walking, going to shows/plays, hanging out with family/friends, watching sports     Review Of Systems (ROS):  [x] Performed Review of systems (Integumentary, CardioPulmonary, Neurological) by intake and observation. Intake form has been scanned into medical record. Patient has been instructed to contact their primary care physician regarding ROS issues if not already being addressed at this time.    [x] Patient history, allergies, meds reviewed. Medical chart reviewed. See intake form.     OBJECTIVE EXAMINATION   3/4/25:   Mvmt (norm) AROM L AROM R Notes PROM L PROM R Notes     LUMBAR Flex (90)         Ext (25)         SB (25)          Rotation (30)             HIP Flex (120) 110 deg 111 deg        Abd (45) WFL WFL  WFL WFL     ER (50)          IR (45)          Ext (20)         KNEE Flex (140) 135 deg  135 deg        Ext (0) 0 deg 0 deg        L HS: 165 degrees, R  deg    MMT L MMT R Notes       HIP  Flexion 4 4      Abduction 4- 3+      ER 3 3+      IR 4 4      Extension 3 3+    KNEE  Flexion 5- 5      Extension 5 5    5xSTS: 15 seconds  30STS: 10 no BUE use standard chair - dec quad control on L    2/4/25:   Mvmt (norm) AROM L AROM R Notes PROM L PROM R Notes     LUMBAR Flex (90)         Ext (25)         SB (25)          Rotation (30)             HIP Flex (120) 106 deg 103 deg  110 deg no pain  108 deg P in hip on side      Abd (45) WFL WFL  WFL WFL     ER (50)          IR (45)          Ext (20)         KNEE Flex (140) 132 deg  128 deg        Ext (0) 0 deg 0 deg          ANKLE DF (20)          PF (50)          Inversion (30)          Eversion (20)           R HS: 155 deg - tightness noted this date   L HS: 143 deg     30\"STS: 10 no BUE use standard chair  5xSTS: 14 seconds no BUE use standard chair - increase in lean towards L      MMT L MMT R Notes       HIP  Flexion 4 4      Abduction 4- 3      ER 3 3      IR 3+ 3+      Extension 3 3+    KNEE  Flexion 5- 5      Extension 5 5

## 2025-03-19 ENCOUNTER — HOSPITAL ENCOUNTER (OUTPATIENT)
Dept: GENERAL RADIOLOGY | Age: 75
Discharge: HOME | End: 2025-03-19
Attending: FAMILY MEDICINE
Payer: MEDICARE

## 2025-03-19 DIAGNOSIS — M81.0 AGE-RELATED OSTEOPOROSIS WITHOUT CURRENT PATHOLOGICAL FRACTURE: ICD-10-CM

## 2025-03-19 PROCEDURE — 77080 DXA BONE DENSITY AXIAL: CPT

## 2025-03-25 ENCOUNTER — HOSPITAL ENCOUNTER (OUTPATIENT)
Dept: PHYSICAL THERAPY | Age: 75
Setting detail: THERAPIES SERIES
Discharge: HOME | End: 2025-03-25
Payer: MEDICARE

## 2025-03-25 PROCEDURE — 97530 THERAPEUTIC ACTIVITIES: CPT

## 2025-03-25 PROCEDURE — 97110 THERAPEUTIC EXERCISES: CPT

## 2025-03-25 PROCEDURE — 97140 MANUAL THERAPY 1/> REGIONS: CPT

## 2025-03-25 PROCEDURE — 97112 NEUROMUSCULAR REEDUCATION: CPT

## 2025-03-25 NOTE — FLOWSHEET NOTE
[facility] - Outpatient Rehabilitation and Therapy [address], [place] [address] office: [phone] fax: [phone]    Physical Therapy: TREATMENT/PROGRESS NOTE   Patient: Adriana Whitley (74 y.o. female)   Examination Date: 2025   :  1950 MRN: [id number]   Visit #:  (10 used +8 approved)  Insurance Allowable Auth Needed   [employer]   25 -3/8/25  3/5-5/3/25 8 V approved [x]Yes    []No    Insurance: Payor: HU[employer]NA MEDICARE / Plan: HU[employer]NA GOLD PLUS HMO / Product Type: *No Product type* /   Insurance ID: [id number] - (Medicare Managed)  Secondary Insurance (if applicable):    Treatment Diagnosis:   Osteoarthritis of knees, bilateral [M17.0]      Medical Diagnosis:  Osteoarthritis of knees, bilateral [M17.0]   Referring Physician: Mark Vasquez MD  PCP: Manda Gomez MD     Plan of care signed (Y/N):     Date of Patient follow up with Physician: MD through [place]     Plan of Care Report: NO  POC update due: (10 visits /OR AUTH LIMITS, whichever is less)  2025                                             Medical History:  Comorbidities:  None  Relevant Medical History: TIA few years ago, osteoporosis                                          Precautions/ Contra-indications:           Latex allergy:  NO  Pacemaker:    NO  Contraindications for Manipulation: None  Date of Surgery: NA  Other:    Red Flags:  None    Suicide Screening:   The patient did not verbalize a primary behavioral concern, suicidal ideation, suicidal intent, or demonstrate suicidal behaviors.    Preferred Language for Healthcare:   [x] English       [] other:    SUBJECTIVE EXAMINATION   Current c/o:  Had Dexa on 3/19 showing osteopenia. Took KT tape off yesterday. Feels like it was better.  Feel like R knee is feeling wobbly today. Had jazzercise prior to session.     he Thursday before she left for [place] almost 2 weeks ago, she got her gel injections into B knees. L knee injection hurt

## 2025-04-01 ENCOUNTER — HOSPITAL ENCOUNTER (OUTPATIENT)
Dept: PHYSICAL THERAPY | Age: 75
Setting detail: THERAPIES SERIES
Discharge: HOME | End: 2025-04-01
Payer: MEDICARE

## 2025-04-01 PROCEDURE — 97112 NEUROMUSCULAR REEDUCATION: CPT

## 2025-04-01 PROCEDURE — 97110 THERAPEUTIC EXERCISES: CPT

## 2025-04-01 NOTE — FLOWSHEET NOTE
[facility] - Outpatient Rehabilitation and Therapy [address], [place] [address] office: [phone] fax: [phone]    Physical Therapy: TREATMENT/PROGRESS NOTE   Patient: Adriana Whitley (74 y.o. female)   Examination Date: 2025   :  1950 MRN: [id number]   Visit #:  (10 used +8 approved)  Insurance Allowable Auth Needed   [employer]   25 -3/8/25  3/5-5/3/25 8 V approved [x]Yes    []No    Insurance: Payor: HUMANA MEDICARE / Plan: HU[employer]NA GOLD PLUS HMO / Product Type: *No Product type* /   Insurance ID: [id number] - (Medicare Managed)  Secondary Insurance (if applicable):    Treatment Diagnosis:   Osteoarthritis of knees, bilateral [M17.0]      Medical Diagnosis:  Osteoarthritis of knees, bilateral [M17.0]   Referring Physician: Mark Vasquez MD  PCP: Manda Gomez MD     Plan of care signed (Y/N):     Date of Patient follow up with Physician: MD through [facility]     Plan of Care Report: NO  POC update due: (10 visits /OR AUTH LIMITS, whichever is less)  2025                                             Medical History:  Comorbidities:  None  Relevant Medical History: TIA few years ago, osteoporosis                                          Precautions/ Contra-indications:           Latex allergy:  NO  Pacemaker:    NO  Contraindications for Manipulation: None  Date of Surgery: NA  Other:    Red Flags:  None    Suicide Screening:   The patient did not verbalize a primary behavioral concern, suicidal ideation, suicidal intent, or demonstrate suicidal behaviors.    Preferred Language for Healthcare:   [x] English       [] other:    SUBJECTIVE EXAMINATION   Current c/o: Has been busy this week so hasn't been doing her exercises as much. Has been going to jazzercise. Is still wearing tape on knee. Twisted her L ankle yesterday.     Eval: States that she was told to come to PT d/t OA. Noticed L knee pain in March/April and the R started shortly after. States she

## 2025-04-08 ENCOUNTER — HOSPITAL ENCOUNTER (OUTPATIENT)
Dept: PHYSICAL THERAPY | Age: 75
Setting detail: THERAPIES SERIES
Discharge: HOME | End: 2025-04-08
Payer: MEDICARE

## 2025-04-08 PROCEDURE — 97140 MANUAL THERAPY 1/> REGIONS: CPT

## 2025-04-08 PROCEDURE — 97530 THERAPEUTIC ACTIVITIES: CPT

## 2025-04-15 ENCOUNTER — APPOINTMENT (OUTPATIENT)
Dept: PHYSICAL THERAPY | Age: 75
End: 2025-04-15
Payer: MEDICARE

## 2025-04-15 ENCOUNTER — HOSPITAL ENCOUNTER (OUTPATIENT)
Dept: PHYSICAL THERAPY | Age: 75
Setting detail: THERAPIES SERIES
Discharge: HOME | End: 2025-04-15
Payer: MEDICARE

## 2025-04-15 PROCEDURE — 97110 THERAPEUTIC EXERCISES: CPT

## 2025-04-15 NOTE — FLOWSHEET NOTE
[facility] - Outpatient Rehabilitation and Therapy [address], [place] [address] office: [phone] fax: [phone]    Physical Therapy: TREATMENT/PROGRESS NOTE   Patient: Adriana Whitley (74 y.o. female)   Examination Date: 04/15/2025   :  1950 MRN: [id number]   Visit #: 15 /18 (10 used +8 approved)  Insurance Allowable Auth Needed   [employer]   25 -3/8/25  3/5-5/3/25 8 V approved [x]Yes    []No    Insurance: Payor: HUMANA MEDICARE / Plan: HUMANA GOLD PLUS HMO / Product Type: *No Product type* /   Insurance ID: [id number] - (Medicare Managed)  Secondary Insurance (if applicable):    Treatment Diagnosis:   Osteoarthritis of knees, bilateral [M17.0]      Medical Diagnosis:  Osteoarthritis of knees, bilateral [M17.0]   Referring Physician: Mark Vasquez MD  PCP: Manda Gomez MD     Plan of care signed (Y/N):     Date of Patient follow up with Physician: MD through [place]     Plan of Care Report: NO  POC update due: (10 visits /OR AUTH LIMITS, whichever is less)  2025                                             Medical History:  Comorbidities:  None  Relevant Medical History: TIA few years ago, osteoporosis                                          Precautions/ Contra-indications:           Latex allergy:  NO  Pacemaker:    NO  Contraindications for Manipulation: None  Date of Surgery: NA  Other:    Red Flags:  None    Suicide Screening:   The patient did not verbalize a primary behavioral concern, suicidal ideation, suicidal intent, or demonstrate suicidal behaviors.    Preferred Language for Healthcare:   [x] English       [] other:    SUBJECTIVE EXAMINATION   Current c/o: knees are feeling the sore. Was told to return in September for gel shots. Continues to report pain with stairs.      Eval: States that she was told to come to PT d/t OA. Noticed L knee pain in March/April and the R started shortly after. States she has most pain with going up/down stairs, with

## 2025-04-22 ENCOUNTER — HOSPITAL ENCOUNTER (OUTPATIENT)
Dept: PHYSICAL THERAPY | Age: 75
Setting detail: THERAPIES SERIES
Discharge: HOME | End: 2025-04-22
Payer: MEDICARE

## 2025-04-22 PROCEDURE — 97140 MANUAL THERAPY 1/> REGIONS: CPT

## 2025-04-22 PROCEDURE — 97110 THERAPEUTIC EXERCISES: CPT

## 2025-04-22 PROCEDURE — 97530 THERAPEUTIC ACTIVITIES: CPT

## 2025-04-22 NOTE — FLOWSHEET NOTE
visit plan to continue current phase     Electronically Signed by Shane Saldivar PT, DPT, CNS Date: 04/22/2025     Note: Portions of this note have been templated and/or copied from initial evaluation, reassessments and prior notes for documentation efficiency.    Note: If patient does not return for scheduled/recommended follow up visits, this note will serve as a discharge from care along with the most recent update on progress.    Ortho Evaluation

## 2025-04-29 ENCOUNTER — HOSPITAL ENCOUNTER (OUTPATIENT)
Dept: PHYSICAL THERAPY | Age: 75
Setting detail: THERAPIES SERIES
Discharge: HOME | End: 2025-04-29
Payer: MEDICARE

## 2025-04-29 PROCEDURE — 97530 THERAPEUTIC ACTIVITIES: CPT

## 2025-04-29 PROCEDURE — 97110 THERAPEUTIC EXERCISES: CPT

## 2025-04-29 NOTE — PLAN OF CARE
physician  [] Other:     TREATMENT PLAN     Frequency/Duration: 2x/week for 6 weeks for the following treatment interventions:    Interventions:  Therapeutic Exercise (00774) including: strength training, ROM, and functional mobility  Therapeutic Activities (68263) including: functional mobility training and education.  Neuromuscular Re-education (37640) activation and proprioception, including postural re-education.    Gait Training (02338) for normalization of ambulation patterns and AD training.   Manual Therapy (44343) as indicated to include: Passive Range of Motion, Gr I-IV mobilizations, and Trigger Point Release  Modalities as needed that may include: Electrical Stimulation and Thermal Agents  Patient education on joint protection, postural re-education, activity modification, and progression of HEP    Plan: Discharge    Electronically Signed by Shane Saldivar PT, DPT, CNS Date: 04/29/2025     Note: Portions of this note have been templated and/or copied from initial evaluation, reassessments and prior notes for documentation efficiency.    Note: If patient does not return for scheduled/recommended follow up visits, this note will serve as a discharge from care along with the most recent update on progress.    Ortho Evaluation

## 2025-08-30 ENCOUNTER — HOSPITAL ENCOUNTER (OUTPATIENT)
Dept: WOMENS IMAGING | Age: 75
Discharge: HOME | End: 2025-08-30
Payer: MEDICARE

## 2025-08-30 DIAGNOSIS — Z12.31 ENCOUNTER FOR SCREENING MAMMOGRAM FOR BREAST CANCER: ICD-10-CM

## 2025-08-30 PROCEDURE — 77063 BREAST TOMOSYNTHESIS BI: CPT
